# Patient Record
Sex: FEMALE | Race: WHITE | NOT HISPANIC OR LATINO | Employment: FULL TIME | URBAN - METROPOLITAN AREA
[De-identification: names, ages, dates, MRNs, and addresses within clinical notes are randomized per-mention and may not be internally consistent; named-entity substitution may affect disease eponyms.]

---

## 2022-09-12 ENCOUNTER — APPOINTMENT (OUTPATIENT)
Dept: ULTRASOUND IMAGING | Facility: HOSPITAL | Age: 37
End: 2022-09-12

## 2022-09-12 ENCOUNTER — HOSPITAL ENCOUNTER (EMERGENCY)
Facility: HOSPITAL | Age: 37
Discharge: HOME | End: 2022-09-12
Attending: EMERGENCY MEDICINE | Admitting: EMERGENCY MEDICINE

## 2022-09-12 VITALS
DIASTOLIC BLOOD PRESSURE: 55 MMHG | HEIGHT: 63 IN | WEIGHT: 186.29 LBS | OXYGEN SATURATION: 100 % | SYSTOLIC BLOOD PRESSURE: 112 MMHG | TEMPERATURE: 98.3 F | HEART RATE: 88 BPM | RESPIRATION RATE: 18 BRPM | BODY MASS INDEX: 33.01 KG/M2

## 2022-09-12 DIAGNOSIS — O20.9 VAGINAL BLEEDING IN PREGNANCY, FIRST TRIMESTER: ICD-10-CM

## 2022-09-12 DIAGNOSIS — O20.0 THREATENED ABORTION: Primary | ICD-10-CM

## 2022-09-12 LAB
ABO GROUP BLD: NORMAL
ABO GROUP BLD: NORMAL
ALBUMIN SERPL-MCNC: 4.4 G/DL (ref 3.5–5.2)
ALBUMIN/GLOB SERPL: 1.2 G/DL
ALP SERPL-CCNC: 75 U/L (ref 39–117)
ALT SERPL W P-5'-P-CCNC: 49 U/L (ref 1–33)
ANION GAP SERPL CALCULATED.3IONS-SCNC: 14.1 MMOL/L (ref 5–15)
APTT PPP: 35 SECONDS (ref 24.2–34.2)
AST SERPL-CCNC: 28 U/L (ref 1–32)
BASOPHILS # BLD AUTO: 0.02 10*3/MM3 (ref 0–0.2)
BASOPHILS NFR BLD AUTO: 0.4 % (ref 0–1.5)
BILIRUB SERPL-MCNC: 0.4 MG/DL (ref 0–1.2)
BLD GP AB SCN SERPL QL: NEGATIVE
BUN SERPL-MCNC: 5 MG/DL (ref 6–20)
BUN/CREAT SERPL: 9.3 (ref 7–25)
CALCIUM SPEC-SCNC: 9.2 MG/DL (ref 8.6–10.5)
CHLORIDE SERPL-SCNC: 99 MMOL/L (ref 98–107)
CO2 SERPL-SCNC: 21.9 MMOL/L (ref 22–29)
CREAT SERPL-MCNC: 0.54 MG/DL (ref 0.57–1)
DEPRECATED RDW RBC AUTO: 44.9 FL (ref 37–54)
EGFRCR SERPLBLD CKD-EPI 2021: 123.3 ML/MIN/1.73
EOSINOPHIL # BLD AUTO: 0.08 10*3/MM3 (ref 0–0.4)
EOSINOPHIL NFR BLD AUTO: 1.6 % (ref 0.3–6.2)
ERYTHROCYTE [DISTWIDTH] IN BLOOD BY AUTOMATED COUNT: 15.5 % (ref 12.3–15.4)
GLOBULIN UR ELPH-MCNC: 3.6 GM/DL
GLUCOSE SERPL-MCNC: 84 MG/DL (ref 65–99)
HCG INTACT+B SERPL-ACNC: NORMAL MIU/ML
HCT VFR BLD AUTO: 36.9 % (ref 34–46.6)
HGB BLD-MCNC: 12.2 G/DL (ref 12–15.9)
IMM GRANULOCYTES # BLD AUTO: 0.01 10*3/MM3 (ref 0–0.05)
IMM GRANULOCYTES NFR BLD AUTO: 0.2 % (ref 0–0.5)
INR PPP: 1.08 (ref 0.86–1.15)
LYMPHOCYTES # BLD AUTO: 1.96 10*3/MM3 (ref 0.7–3.1)
LYMPHOCYTES NFR BLD AUTO: 38.7 % (ref 19.6–45.3)
MCH RBC QN AUTO: 26.7 PG (ref 26.6–33)
MCHC RBC AUTO-ENTMCNC: 33.1 G/DL (ref 31.5–35.7)
MCV RBC AUTO: 80.7 FL (ref 79–97)
MONOCYTES # BLD AUTO: 0.25 10*3/MM3 (ref 0.1–0.9)
MONOCYTES NFR BLD AUTO: 4.9 % (ref 5–12)
NEUTROPHILS NFR BLD AUTO: 2.74 10*3/MM3 (ref 1.7–7)
NEUTROPHILS NFR BLD AUTO: 54.2 % (ref 42.7–76)
NRBC BLD AUTO-RTO: 0 /100 WBC (ref 0–0.2)
NUMBER OF DOSES: NORMAL
PLATELET # BLD AUTO: 321 10*3/MM3 (ref 140–450)
PMV BLD AUTO: 10.1 FL (ref 6–12)
POTASSIUM SERPL-SCNC: 3.3 MMOL/L (ref 3.5–5.2)
PROT SERPL-MCNC: 8 G/DL (ref 6–8.5)
PROTHROMBIN TIME: 14.1 SECONDS (ref 11.8–14.9)
RBC # BLD AUTO: 4.57 10*6/MM3 (ref 3.77–5.28)
RH BLD: NEGATIVE
RH BLD: NEGATIVE
SODIUM SERPL-SCNC: 135 MMOL/L (ref 136–145)
WBC NRBC COR # BLD: 5.06 10*3/MM3 (ref 3.4–10.8)

## 2022-09-12 PROCEDURE — 84702 CHORIONIC GONADOTROPIN TEST: CPT | Performed by: PHYSICIAN ASSISTANT

## 2022-09-12 PROCEDURE — 86900 BLOOD TYPING SEROLOGIC ABO: CPT | Performed by: EMERGENCY MEDICINE

## 2022-09-12 PROCEDURE — 25010000002 RHO D IMMUNE GLOBULIN 1500 UNIT/2ML SOLUTION PREFILLED SYRINGE: Performed by: PHYSICIAN ASSISTANT

## 2022-09-12 PROCEDURE — 86850 RBC ANTIBODY SCREEN: CPT | Performed by: EMERGENCY MEDICINE

## 2022-09-12 PROCEDURE — 80053 COMPREHEN METABOLIC PANEL: CPT | Performed by: PHYSICIAN ASSISTANT

## 2022-09-12 PROCEDURE — 86901 BLOOD TYPING SEROLOGIC RH(D): CPT | Performed by: PHYSICIAN ASSISTANT

## 2022-09-12 PROCEDURE — 86900 BLOOD TYPING SEROLOGIC ABO: CPT | Performed by: PHYSICIAN ASSISTANT

## 2022-09-12 PROCEDURE — 85025 COMPLETE CBC W/AUTO DIFF WBC: CPT | Performed by: PHYSICIAN ASSISTANT

## 2022-09-12 PROCEDURE — 76801 OB US < 14 WKS SINGLE FETUS: CPT

## 2022-09-12 PROCEDURE — 86901 BLOOD TYPING SEROLOGIC RH(D): CPT | Performed by: EMERGENCY MEDICINE

## 2022-09-12 PROCEDURE — 96372 THER/PROPH/DIAG INJ SC/IM: CPT

## 2022-09-12 PROCEDURE — 36415 COLL VENOUS BLD VENIPUNCTURE: CPT | Performed by: PHYSICIAN ASSISTANT

## 2022-09-12 PROCEDURE — 85610 PROTHROMBIN TIME: CPT | Performed by: PHYSICIAN ASSISTANT

## 2022-09-12 PROCEDURE — 36415 COLL VENOUS BLD VENIPUNCTURE: CPT

## 2022-09-12 PROCEDURE — 85730 THROMBOPLASTIN TIME PARTIAL: CPT | Performed by: PHYSICIAN ASSISTANT

## 2022-09-12 PROCEDURE — 99282 EMERGENCY DEPT VISIT SF MDM: CPT

## 2022-09-12 RX ADMIN — HUMAN RHO(D) IMMUNE GLOBULIN 300 MCG: 1500 SOLUTION INTRAMUSCULAR; INTRAVENOUS at 19:09

## 2022-09-12 NOTE — DISCHARGE INSTRUCTIONS
Your ultrasound shows a single fetus measuring 10 weeks and 4 days with a heart rate of 165. Your blood work and hormones look good. Call your OB/GYN first thing tomorrow to discuss your symptoms and schedule a close follow-up.

## 2022-09-12 NOTE — ED PROVIDER NOTES
Provider in Triage Note    --- PROVIDER IN TRIAGE NOTE ---    Patient was evaluated in triage by Bi ramos PA-C.  In short, the pt presented with vaginal bleeding. LMP 2022. She is . No complications with prior pregnancies or deliveries. Bleeding started yesterday. Bleeding was heavy, bright red. Last evening had large clots. Bleeding has stopped since last evening but spotting on toilet paper. Denies dysuria, abdominal pain, vomiting or diarrhea. Has had mild nausea at baseline. Had episode of back pain 2 days. Last intercourse was 2 days ago. Denies vaginal discharge/itching/odor.  First OB appointment scheduled for 10/10/2022. Orders were written and the patient was placed in the waiting room, currently awaiting disposition.       Time: 3:35 PM EDT  Arrived by: private car  Chief Complaint: vaginal bleeding - pregnant   History provided by: patient  History is limited by: N/A     History of Present Illness:  Patient is a 36 y.o. year old female who presents to the emergency department with vaginal bleeding.           History provided by:  Patient   used: No    Vaginal Bleeding - Pregnant  Quality:  Bright red and clots  Severity:  Moderate  Onset quality:  Gradual  Progression:  Partially resolved  Chronicity:  New  Prior pregnancy: .    Pregnancy confirmed by ultrasound: no    Gestational age:  LMP 2022  Prenatal care:  No prenatal care  Context: not after intercourse    Relieved by:  Nothing  Worsened by:  Nothing  Ineffective treatments:  None tried  Associated symptoms: nausea (at baseline)    Associated symptoms: no abdominal pain, no dyspareunia, no dysuria, no fever and no vaginal discharge    Risk factors: no bleeding disorder, no hx of ectopic pregnancy and no prior miscarriage        Similar Symptoms Previously: n/a  Recently seen: n/a      Patient Care Team  Primary Care Provider: Provider, No Known    Past Medical History:     No Known Allergies  History  "reviewed. No pertinent past medical history.  History reviewed. No pertinent surgical history.  History reviewed. No pertinent family history.    Home Medications:  Prior to Admission medications    Not on File        Social History:   Social History     Tobacco Use   • Smoking status: Never Smoker   • Smokeless tobacco: Never Used   Vaping Use   • Vaping Use: Never used   Substance Use Topics   • Alcohol use: Never   • Drug use: Never     Recent travel: not applicable     Review of Systems:  Review of Systems   Constitutional: Negative for chills and fever.   HENT: Negative for congestion and sore throat.    Respiratory: Negative for cough and shortness of breath.    Cardiovascular: Negative for chest pain and palpitations.   Gastrointestinal: Positive for nausea (at baseline). Negative for abdominal pain, diarrhea and vomiting.   Genitourinary: Positive for vaginal bleeding. Negative for dyspareunia, dysuria, vaginal discharge and vaginal pain.   Neurological: Negative for headaches.   All other systems reviewed and are negative.       Physical Exam:  /55 (BP Location: Left arm, Patient Position: Sitting)   Pulse 88   Temp 98.3 °F (36.8 °C) (Oral)   Resp 18   Ht 160 cm (63\")   Wt 84.5 kg (186 lb 4.6 oz)   LMP 06/09/2022 Comment: unknown MONI has not been able to get an appointment yet w OBGYN  SpO2 100%   Breastfeeding No   BMI 33.00 kg/m²     Physical Exam  Vitals and nursing note reviewed.   Constitutional:       Appearance: Normal appearance. She is not ill-appearing or toxic-appearing.   HENT:      Head: Normocephalic.      Nose: Nose normal.      Mouth/Throat:      Mouth: Mucous membranes are moist.   Eyes:      Conjunctiva/sclera: Conjunctivae normal.      Pupils: Pupils are equal, round, and reactive to light.   Cardiovascular:      Rate and Rhythm: Normal rate and regular rhythm.   Pulmonary:      Effort: Pulmonary effort is normal.      Breath sounds: Normal breath sounds.   Abdominal:      " General: There is no distension.   Musculoskeletal:         General: Normal range of motion.      Cervical back: Normal range of motion and neck supple.   Skin:     General: Skin is warm and dry.      Capillary Refill: Capillary refill takes less than 2 seconds.   Neurological:      General: No focal deficit present.      Mental Status: She is alert and oriented to person, place, and time.   Psychiatric:         Mood and Affect: Mood normal.         Behavior: Behavior normal.                Medications in the Emergency Department:  Medications   Rho D Immune Globulin (RHOPHYLAC) injection 300 mcg (300 mcg Intramuscular Given 9/12/22 1909)        Labs  Lab Results (last 24 hours)     Procedure Component Value Units Date/Time    CBC & Differential [[id number]]  (Abnormal) Collected: 09/12/22 1603    Specimen: Blood Updated: 09/12/22 1615    Narrative:      The following orders were created for panel order CBC & Differential.  Procedure                               Abnormality         Status                     ---------                               -----------         ------                     CBC Auto Differential[[id number]]        Abnormal            Final result                 Please view results for these tests on the individual orders.    Comprehensive Metabolic Panel [[id number]]  (Abnormal) Collected: 09/12/22 1603    Specimen: Blood Updated: 09/12/22 1640     Glucose 84 mg/dL      BUN 5 mg/dL      Creatinine 0.54 mg/dL      Sodium 135 mmol/L      Potassium 3.3 mmol/L      Chloride 99 mmol/L      CO2 21.9 mmol/L      Calcium 9.2 mg/dL      Total Protein 8.0 g/dL      Albumin 4.40 g/dL      ALT (SGPT) 49 U/L      AST (SGOT) 28 U/L      Alkaline Phosphatase 75 U/L      Total Bilirubin 0.4 mg/dL      Globulin 3.6 gm/dL      A/G Ratio 1.2 g/dL      BUN/Creatinine Ratio 9.3     Anion Gap 14.1 mmol/L      eGFR 123.3 mL/min/1.73      Comment: National Kidney Foundation and American Society of Nephrology (ASN) Task  Force recommended calculation based on the Chronic Kidney Disease Epidemiology Collaboration (CKD-EPI) equation refit without adjustment for race.       Narrative:      GFR Normal >60  Chronic Kidney Disease <60  Kidney Failure <15      Protime-INR [[id number]]  (Normal) Collected: 09/12/22 1603    Specimen: Blood Updated: 09/12/22 1632     Protime 14.1 Seconds      INR 1.08    Narrative:      Suggested Therapeutic Ranges For Oral Anticoagulant Therapy:  Level of Therapy                      INR Target Range  Standard Dose                            2.0-3.0  High Dose                                2.5-3.5  Patients not receiving anticoagulant  Therapy Normal Range                     0.86-1.15    aPTT [[id number]]  (Abnormal) Collected: 09/12/22 1603    Specimen: Blood Updated: 09/12/22 1632     PTT 35.0 seconds     hCG, Quantitative, Pregnancy [[id number]] Collected: 09/12/22 1603    Specimen: Blood Updated: 09/12/22 1658     HCG Quantitative 155,187.00 mIU/mL     Narrative:      HCG Ranges by Gestational Age    Females - non-pregnant premenopausal   </= 1mIU/mL HCG  Females - postmenopausal               </= 7mIU/mL HCG    3 Weeks       5.4   -      72 mIU/mL  4 Weeks      10.2   -     708 mIU/mL  5 Weeks       217   -   8,245 mIU/mL  6 Weeks       152   -  32,177 mIU/mL  7 Weeks     4,059   - 153,767 mIU/mL  8 Weeks    31,366   - 149,094 mIU/mL  9 Weeks    59,109   - 135,901 mIU/mL  10 Weeks   44,186   - 170,409 mIU/mL  12 Weeks   27,107   - 201,615 mIU/mL  14 Weeks   24,302   -  93,646 mIU/mL  15 Weeks   12,540   -  69,747 mIU/mL  16 Weeks    8,904   -  55,332 mIU/mL  17 Weeks    8,240   -  51,793 mIU/mL  18 Weeks    9,649   -  55,271 mIU/mL    Results may be falsely decreased if patient taking Biotin.      CBC Auto Differential [[id number]]  (Abnormal) Collected: 09/12/22 1603    Specimen: Blood Updated: 09/12/22 1615     WBC 5.06 10*3/mm3      RBC 4.57 10*6/mm3      Hemoglobin 12.2 g/dL      Hematocrit 36.9 %       MCV 80.7 fL      MCH 26.7 pg      MCHC 33.1 g/dL      RDW 15.5 %      RDW-SD 44.9 fl      MPV 10.1 fL      Platelets 321 10*3/mm3      Neutrophil % 54.2 %      Lymphocyte % 38.7 %      Monocyte % 4.9 %      Eosinophil % 1.6 %      Basophil % 0.4 %      Immature Grans % 0.2 %      Neutrophils, Absolute 2.74 10*3/mm3      Lymphocytes, Absolute 1.96 10*3/mm3      Monocytes, Absolute 0.25 10*3/mm3      Eosinophils, Absolute 0.08 10*3/mm3      Basophils, Absolute 0.02 10*3/mm3      Immature Grans, Absolute 0.01 10*3/mm3      nRBC 0.0 /100 WBC            Imaging:  US Ob < 14 Weeks Single or First Gestation    Result Date: 2022  PROCEDURE: US OB < 14 WEEKS SINGLE OR FIRST GESTATION  COMPARISON: None  INDICATIONS: vaginal bleeding  TECHNIQUE: Transabdominal OB pelvic ultrasound.   FINDINGS:   There is a single living intrauterine gestation.  Estimated gestational age by ultrasound 10 weeks 4 days.  Estimated date of delivery 2023.  Mean sac diameter 5 cm. Crown rump length 3 cm.  Fetal heart rate 165 bpm.  The ovaries have a normal appearance.  No abnormal pelvic fluid collections are identified.  IMPRESSION: Single living intrauterine gestation.  Estimated gestational age 10 weeks 4 days.  Estimated date of delivery 2023.   MONTANA POST MD       Electronically Signed and Approved By: MONTANA POST MD on 2022 at 17:32               Procedures:  Procedures    Progress  ED Course as of 22 2225   Mon Sep 12, 2022   1744 HCG Quantitative: 155,187.00 [KM]      ED Course User Index  [KM] Bi cMkenna PA-C                            The patient was initially evaluated in the triage area where orders were placed. The patient was later dispositioned by Bi Mckenna PA-C.      Medical Decision Making:  MDM     37 yo F  presents to ED with chief complaint of vaginal bleeding. Started yesterday, had episode of BRB and 2-3 clots but now only scant with wiping. Denies discharge, abdominal or  back pain. First OB appt 10/10/2022 in [place].     Abdomen benign, BS active, vitals reassuring.     CBC with WBC 5.06, no neutrophilia, H&H WNL  ,187  ABO/Rh O negative  PTT mildly elevated at 35  PT/INR wnl  CMP with mild hypokalemia, ALT 49 no prior on file to compare to. Normal total bili and no anion gap    OB US shows single IUP, fetal movement noted, . No evidence ectopic. No free fluid.     Discussed all findings with patient. Will administer rho-dedra in the ED. We discussed the diagnosis of threatened . Pt is to call her OB/GYN office in the morning and have close follow-up. Strict return precautions discussed including worsening abdominal pain, worsening bleeding olayinka > 1 pph.    I have spoken with the pt. I have explained the patient´s condition, diagnoses and treatment plan based on the information available to me at this time. I have answered the patient questions and addressed any concerns. The patient has a good  understanding of the patient´s diagnosis, condition, and treatment plan as can be expected at this point. The vital signs have been stable. The patient´s condition is stable and appropriate for discharge from the emergency department.      The patient will pursue further outpatient evaluation with the primary care physician or other designated or consulting physician as outlined in the discharge instructions. They are agreeable to this plan of care and follow-up instructions have been explained in detail. The patient  has received these instructions in written format and have expressed an understanding of the discharge instructions. The patient is aware that any significant change in condition or worsening of symptoms should prompt an immediate return to this or the closest emergency department or call to 911.     US Ob < 14 Weeks Single or First Gestation   Final Result         Labs Reviewed   COMPREHENSIVE METABOLIC PANEL - Abnormal; Notable for the following  components:       Result Value    BUN 5 (*)     Creatinine 0.54 (*)     Sodium 135 (*)     Potassium 3.3 (*)     CO2 21.9 (*)     ALT (SGPT) 49 (*)     All other components within normal limits    Narrative:     GFR Normal >60  Chronic Kidney Disease <60  Kidney Failure <15     APTT - Abnormal; Notable for the following components:    PTT 35.0 (*)     All other components within normal limits   CBC WITH AUTO DIFFERENTIAL - Abnormal; Notable for the following components:    RDW 15.5 (*)     Monocyte % 4.9 (*)     All other components within normal limits   PROTIME-INR - Normal    Narrative:     Suggested Therapeutic Ranges For Oral Anticoagulant Therapy:  Level of Therapy                      INR Target Range  Standard Dose                            2.0-3.0  High Dose                                2.5-3.5  Patients not receiving anticoagulant  Therapy Normal Range                     0.86-1.15   HCG, QUANTITATIVE, PREGNANCY    Narrative:     HCG Ranges by Gestational Age    Females - non-pregnant premenopausal   </= 1mIU/mL HCG  Females - postmenopausal               </= 7mIU/mL HCG    3 Weeks       5.4   -      72 mIU/mL  4 Weeks      10.2   -     708 mIU/mL  5 Weeks       217   -   8,245 mIU/mL  6 Weeks       152   -  32,177 mIU/mL  7 Weeks     4,059   - 153,767 mIU/mL  8 Weeks    31,366   - 149,094 mIU/mL  9 Weeks    59,109   - 135,901 mIU/mL  10 Weeks   44,186   - 170,409 mIU/mL  12 Weeks   27,107   - 201,615 mIU/mL  14 Weeks   24,302   -  93,646 mIU/mL  15 Weeks   12,540   -  69,747 mIU/mL  16 Weeks    8,904   -  55,332 mIU/mL  17 Weeks    8,240   -  51,793 mIU/mL  18 Weeks    9,649   -  55,271 mIU/mL    Results may be falsely decreased if patient taking Biotin.     ABO/RH    RHIG EVALUATION   DOSES OF RH IMMUNE GLOBULIN   CBC AND DIFFERENTIAL    Narrative:     The following orders were created for panel order CBC & Differential.  Procedure                               Abnormality         Status                      ---------                               -----------         ------                     CBC Auto Differential[[id number]]        Abnormal            Final result                 Please view results for these tests on the individual orders.    s  Final diagnoses:   Threatened    Vaginal bleeding in pregnancy, first trimester        Disposition:  ED Disposition     ED Disposition   Discharge    Condition   Stable    Comment   --             This medical record created using voice recognition software.           Bi Mckenna PA-C  [id number]

## 2022-10-09 NOTE — PROGRESS NOTES
OBSTETRIC HISTORY AND PHYSICAL     Subjective:  Latricia Alvarez is a 36 y.o.  at Unknown  here for her new OB visit. Patient pregnancy is dated by an early US at 10w4d. Patient's pregnancy is complicated by AMA, Multigravida, family H/O hemophilia.   She is taking her prenatal vitamins.Reports no loss of fluid or vaginal bleeding.No hx of genetic, bleeding, endocrine, chromosome disorder in both patient and partner. No history of multiple gestations, congenital anomalies or mental retardation.    FOB involvement: yes  Pediatrician: yes  Breast/Bottle: breast  Epidural: yes  Discussed adequate water intake, food guidelines/weight gain, limit caffiene to less than 200mg daily.   Discussed food, activities to avoid. Discussed seatbelt safety.   Reviewed safe meds in pregnancy handout.  Taking PNV: yes  Smoking cessation needed: no     Reviewed and updated:  OBHx, GYNHx (STDs), PMHx, Medications, Allergies, PSHx, Social Hx, Preventative Hx (PAP), Hx of abuse/safe environment, Vaccine Hx including hx of chickenpox or vaccine, Genetic Hx (pt, FOB, both families).        OB History    Para Term  AB Living   6 4 4   1 4   SAB IAB Ectopic Molar Multiple Live Births     1       4      # Outcome Date GA Lbr Aaron/2nd Weight Sex Delivery Anes PTL Lv   6 Current            5 Term 19 39w1d / 00:13 3920 g (8 lb 10.3 oz) F Vag-Spont EPI N BEA   4 Term  39w0d   F Vag-Spont   BEA   3 IAB            2 Term  40w0d   F Vag-Spont   BAE   1 Term  39w0d   F Vag-Spont   BEA     History reviewed. No pertinent past medical history.  History reviewed. No pertinent surgical history.  Family History   Problem Relation Age of Onset   • Breast cancer Paternal Grandmother    • Bone cancer Maternal Grandfather      No Known Allergies  Social History     Socioeconomic History   • Marital status:    Tobacco Use   • Smoking status: Never   • Smokeless tobacco: Never   Vaping Use   • Vaping Use: Never used    Substance and Sexual Activity   • Alcohol use: Never   • Drug use: Never   • Sexual activity: Yes     Partners: Male     Birth control/protection: None           ROS:  General ROS: negative for - chills or fatigue  Respiratory ROS: negative for - cough or hemoptysis  Cardiovascular ROS: negative for - chest pain or dyspnea on exertion  Gastrointestinal ROS: negative for - abdominal pain or appetite loss  Musculoskeletal ROS: negative for - gait disturbance or joint pain  Neurological ROS: negative for - behavioral changes or bowel and bladder control changes  Dermatological ROS: negative for rashes or lesions     Objective:  Physical Exam:   Vitals:    10/10/22 1055   BP: 105/71       General appearance - alert, well appearing, and in no distress  Mental status - alert, oriented to person, place, and time  Neck- Supple.  No nodularity or enlargement.  Heart- Regular rate and rhythm without murmur, gallop or rub.  Lungs- Clear to auscultation bilaterally, negative W/R/R  Breasts- Deferred to annual  Abdomen- Soft, Gravid uterus, non-tender  Extremeties: Normal ROM, Negative swelling or cyanosis  Neurological: Gait normal, Negative tingling or loss of sensation     Pelvic exam:   External genitalia- Normal, no lesions  Urethra- Normal, no masses, non tender  Vagina- Normal, no discharge  Bladder- Normal, no masses, non tender  Cervix- Normal, no lesions, no discharge, no CMT, pap smear obtained   Uterus- Normal shape and consistency, non tender.  FHR: 160 per bedside US   Adnexa- Normal, no mass, non tender    Counseling:   • Nutrition discussed, calories, activity/exercise in pregnancy  • Reviewed what to expect prenatal visits, office providers  • Appropriate trimester precautions provided, N/V, vag bleeding, cramping  • Questions answered  • VACCINE importance in pregnancy discussed.  Maternal and fetal risk of not being vaccinated reviewed NLT increased risk maternal/fetal severity of illness/death, PTD, CS,  hemorrhage, HTN, possible IUFD.  Significant maternal and fetal/infant benefit w vaccination.  FDA approval and ACOG/SMFM/CDC strong recommendation in pregnancy.  Questions answered.   • Discussed healthy weight gain.  Also discussed increased water intake, 200mg of caffeine daily, exercise and healthy eating habits.   • Encouraged patient to consider breastfeeding. Discussed that it is recommended by the CDC and WHO that women exclusively breastfeed for the first 6 months and continue to breastfeed up to one year. Discussed the health benefits of breastfeeding, including increased immune systems in infants, reduced risk of food allergies, and reduced risk of chronic disease as an adult. Maternal benefits include more PP weight loss, reduced risk of PP depression, breast/ovarian cancer risk reduced.     ASSESSMENT  Diagnoses and all orders for this visit:    1. Supervision of other normal pregnancy, antepartum (Primary)  -     OB Panel With HIV  -     POC Pregnancy, Urine  -     POC Urinalysis Dipstick  -     Urine Culture - Urine, Urine, Clean Catch  -     Hemoglobinopathy Fractionation Cascade  -     IGP,CtNgTv,rfx Aptima HPV ASCU  -     US Ob Detail Fetal Anatomy Single or First Gestation; Future    2. Rh negative, antepartum    3. Antepartum multigravida of advanced maternal age    4. 14 weeks gestation of pregnancy      PLAN:   -81mg ASA daily   -Patient to confirm type of hemophilia that father has   -Anatomy US in 4 weeks  -Prenatal labs drawn   -Declined NIPS, CF, MSAFP         Return in about 4 weeks (around 11/7/2022) for Routine Ob visit with Anatomy scan.    We have gone over the expected prenatal care to include the timing and content of visits including the anatomy ultrasound.  We discussed the content of the anatomy ultrasound and its limitations (the fact that ultrasound in general may not see up to 40% of abnormalities).  I informed her how to contact the office and/or on call person in the event of  any problems and encouraged her to do so when she feels it is necessary.  We then spent time answering her questions which she indicated were answered to her satisfaction.    Jadyn Miranda,   10/10/2022 12:20 EDT

## 2022-10-10 ENCOUNTER — INITIAL PRENATAL (OUTPATIENT)
Dept: OBSTETRICS AND GYNECOLOGY | Facility: CLINIC | Age: 37
End: 2022-10-10

## 2022-10-10 VITALS — BODY MASS INDEX: 32.24 KG/M2 | DIASTOLIC BLOOD PRESSURE: 71 MMHG | SYSTOLIC BLOOD PRESSURE: 105 MMHG | WEIGHT: 182 LBS

## 2022-10-10 DIAGNOSIS — O26.899 RH NEGATIVE, ANTEPARTUM: ICD-10-CM

## 2022-10-10 DIAGNOSIS — Z3A.14 14 WEEKS GESTATION OF PREGNANCY: ICD-10-CM

## 2022-10-10 DIAGNOSIS — Z67.91 RH NEGATIVE, ANTEPARTUM: ICD-10-CM

## 2022-10-10 DIAGNOSIS — O09.529 ANTEPARTUM MULTIGRAVIDA OF ADVANCED MATERNAL AGE: ICD-10-CM

## 2022-10-10 DIAGNOSIS — Z34.80 SUPERVISION OF OTHER NORMAL PREGNANCY, ANTEPARTUM: Primary | ICD-10-CM

## 2022-10-10 PROBLEM — Z83.2: Status: ACTIVE | Noted: 2022-10-10

## 2022-10-10 LAB
ABO GROUP BLD: NORMAL
B-HCG UR QL: POSITIVE
BASOPHILS # BLD AUTO: 0.02 10*3/MM3 (ref 0–0.2)
BASOPHILS NFR BLD AUTO: 0.4 % (ref 0–1.5)
BLD GP AB SCN SERPL QL: POSITIVE
DEPRECATED RDW RBC AUTO: 44.8 FL (ref 37–54)
EOSINOPHIL # BLD AUTO: 0.17 10*3/MM3 (ref 0–0.4)
EOSINOPHIL NFR BLD AUTO: 3.7 % (ref 0.3–6.2)
ERYTHROCYTE [DISTWIDTH] IN BLOOD BY AUTOMATED COUNT: 15.3 % (ref 12.3–15.4)
EXPIRATION DATE: ABNORMAL
GLUCOSE UR STRIP-MCNC: NEGATIVE MG/DL
HCT VFR BLD AUTO: 31.8 % (ref 34–46.6)
HGB BLD-MCNC: 10.9 G/DL (ref 12–15.9)
HIV1+2 AB SER QL: NORMAL
IMM GRANULOCYTES # BLD AUTO: 0.01 10*3/MM3 (ref 0–0.05)
IMM GRANULOCYTES NFR BLD AUTO: 0.2 % (ref 0–0.5)
INTERNAL NEGATIVE CONTROL: ABNORMAL
INTERNAL POSITIVE CONTROL: ABNORMAL
LYMPHOCYTES # BLD AUTO: 1.79 10*3/MM3 (ref 0.7–3.1)
LYMPHOCYTES NFR BLD AUTO: 39.2 % (ref 19.6–45.3)
Lab: ABNORMAL
MCH RBC QN AUTO: 27.3 PG (ref 26.6–33)
MCHC RBC AUTO-ENTMCNC: 34.3 G/DL (ref 31.5–35.7)
MCV RBC AUTO: 79.5 FL (ref 79–97)
MONOCYTES # BLD AUTO: 0.31 10*3/MM3 (ref 0.1–0.9)
MONOCYTES NFR BLD AUTO: 6.8 % (ref 5–12)
NEUTROPHILS NFR BLD AUTO: 2.27 10*3/MM3 (ref 1.7–7)
NEUTROPHILS NFR BLD AUTO: 49.7 % (ref 42.7–76)
NRBC BLD AUTO-RTO: 0 /100 WBC (ref 0–0.2)
PLATELET # BLD AUTO: 258 10*3/MM3 (ref 140–450)
PMV BLD AUTO: 10 FL (ref 6–12)
PROT UR STRIP-MCNC: ABNORMAL MG/DL
RBC # BLD AUTO: 4 10*6/MM3 (ref 3.77–5.28)
RESIDUAL RHIG DETECTED: NORMAL
RH BLD: NEGATIVE
WBC NRBC COR # BLD: 4.57 10*3/MM3 (ref 3.4–10.8)

## 2022-10-10 PROCEDURE — 87591 N.GONORRHOEAE DNA AMP PROB: CPT | Performed by: OBSTETRICS & GYNECOLOGY

## 2022-10-10 PROCEDURE — 86803 HEPATITIS C AB TEST: CPT | Performed by: OBSTETRICS & GYNECOLOGY

## 2022-10-10 PROCEDURE — 87491 CHLMYD TRACH DNA AMP PROBE: CPT | Performed by: OBSTETRICS & GYNECOLOGY

## 2022-10-10 PROCEDURE — 87086 URINE CULTURE/COLONY COUNT: CPT | Performed by: OBSTETRICS & GYNECOLOGY

## 2022-10-10 PROCEDURE — G0123 SCREEN CERV/VAG THIN LAYER: HCPCS | Performed by: OBSTETRICS & GYNECOLOGY

## 2022-10-10 PROCEDURE — 86870 RBC ANTIBODY IDENTIFICATION: CPT | Performed by: OBSTETRICS & GYNECOLOGY

## 2022-10-10 PROCEDURE — 81025 URINE PREGNANCY TEST: CPT | Performed by: OBSTETRICS & GYNECOLOGY

## 2022-10-10 PROCEDURE — 83020 HEMOGLOBIN ELECTROPHORESIS: CPT | Performed by: OBSTETRICS & GYNECOLOGY

## 2022-10-10 PROCEDURE — G0432 EIA HIV-1/HIV-2 SCREEN: HCPCS | Performed by: OBSTETRICS & GYNECOLOGY

## 2022-10-10 PROCEDURE — 87661 TRICHOMONAS VAGINALIS AMPLIF: CPT | Performed by: OBSTETRICS & GYNECOLOGY

## 2022-10-10 PROCEDURE — 0501F PRENATAL FLOW SHEET: CPT | Performed by: OBSTETRICS & GYNECOLOGY

## 2022-10-11 LAB
BACTERIA SPEC AEROBE CULT: NO GROWTH
HBV SURFACE AG SERPL QL IA: NORMAL
HCV AB SER DONR QL: NORMAL
RPR SER QL: NORMAL

## 2022-10-12 LAB
HGB A MFR BLD ELPH: 97.5 % (ref 96.4–98.8)
HGB A2 MFR BLD ELPH: 2.5 % (ref 1.8–3.2)
HGB F MFR BLD ELPH: 0 % (ref 0–2)
HGB FRACT BLD-IMP: NORMAL
HGB S MFR BLD ELPH: 0 %
RUBV IGG SERPL IA-ACNC: 1.97 INDEX

## 2022-10-16 LAB
C TRACH RRNA CVX QL NAA+PROBE: NEGATIVE
CONV .: NORMAL
CYTOLOGIST CVX/VAG CYTO: NORMAL
CYTOLOGY CVX/VAG DOC CYTO: NORMAL
CYTOLOGY CVX/VAG DOC THIN PREP: NORMAL
DX ICD CODE: NORMAL
HIV 1 & 2 AB SER-IMP: NORMAL
N GONORRHOEA RRNA CVX QL NAA+PROBE: NEGATIVE
OTHER STN SPEC: NORMAL
STAT OF ADQ CVX/VAG CYTO-IMP: NORMAL
T VAGINALIS RRNA SPEC QL NAA+PROBE: NEGATIVE

## 2022-11-06 PROBLEM — Z34.80 SUPERVISION OF OTHER NORMAL PREGNANCY, ANTEPARTUM: Status: ACTIVE | Noted: 2022-11-06

## 2022-11-06 NOTE — ASSESSMENT & PLAN NOTE
MONI finalize:Estimated Date of Delivery: 4/6/23      Genetic testing (NIPS-Quad)/CF/AFP:  Declined     COVID: recommended   Flu: recommended   Tdap:recommended     Rhogam: O Negative, rhogam at 28 weeks     Sterilization:?    Anatomy US:Growth at 77%, all anatomy visualized and WNL, Cephalic, 3VC, posterior placenta   FU US:

## 2022-11-06 NOTE — PROGRESS NOTES
Routine Prenatal Visit     Subjective  Latricia Alvarez is a 36 y.o.  at 18w4d here for her routine OB visit.   She is taking her prenatal vitamins.Reports no loss of fluid or vaginal bleeding. Patient doing well without any complaints. Pregnancy is complicated by:     Patient Active Problem List   Diagnosis   • FHx: hemophilia   • AMA (advanced maternal age) multigravida 35+   • Rh negative, antepartum   • Supervision of other normal pregnancy, antepartum         OB History    Para Term  AB Living   6 4 4   1 4   SAB IAB Ectopic Molar Multiple Live Births     1       4      # Outcome Date GA Lbr Aaron/2nd Weight Sex Delivery Anes PTL Lv   6 Current            5 Term 19 39w1d / 00:13 3920 g (8 lb 10.3 oz) F Vag-Spont EPI N BEA   4 Term  39w0d   F Vag-Spont   BEA   3 IAB            2 Term  40w0d   F Vag-Spont   BEA   1 Term  39w0d   F Vag-Spont   BEA           ROS:   General ROS: negative for - chills or fatigue  Respiratory ROS: negative for - cough or hemoptysis  Cardiovascular ROS: negative for - chest pain or dyspnea on exertion  Genito-Urinary ROS: negative for  change in urinary stream, vaginal discharge   Musculoskeletal ROS: negative for - gait disturbance or joint pain  Dermatological ROS: negative for acne,  dry skin or itching    Objective  Physical Exam:   Vitals:    22 1105   BP: 120/73       FHT: 110-160 BPM    General appearance - alert, well appearing, and in no distress  Mental status - alert, oriented to person, place, and time  Abdomen- Soft, Gravid uterus, non-tender to palpation  Musculoskeletal: negative for - gait disturbance or joint pain  Extremeties: negative swelling or cyanosis   Dermatological: negative rashes or skin lesions     Assessment/Plan:   Diagnoses and all orders for this visit:    1. Supervision of other normal pregnancy, antepartum (Primary)  Assessment & Plan:  MONI finalize:Estimated Date of Delivery: 23      Genetic testing  (NIPS-Quad)/CF/AFP:  Declined     COVID: recommended   Flu: recommended   Tdap:recommended     Rhogam: O Negative, rhogam at 28 weeks     Sterilization:?    Anatomy US:Growth at 77%, all anatomy visualized and WNL, Cephalic, 3VC, posterior placenta   FU US:    Orders:  -     POC Urinalysis Dipstick    2. FHx: hemophilia    3. Antepartum multigravida of advanced maternal age  Assessment & Plan:  81mg ASA daily       4. Rh negative, antepartum        Counseling:   Second trimester precautions  Round Ligament Pain:  The uterus has several ligaments which provide support and keep the uterus in place. As the  uterus grows these ligaments are pulled and stretched which often causes sharp stabbing like pain in the inguinal area.   You may find a pregnancy support band helpful. Changing positions may also help. Yoga is a great way to cope with round ligament and low back pain in pregnancy.    Massage may also help with low back pain   Things to Consider at this Point in your Pregnancy:  Some women experience swelling in their feet during pregnancy. Compression stockings may help  Drink plenty of water and stay active   Make sure you are eating frequent small meals, nuts are a wonderful snack to keep with you            Return in about 4 weeks (around 12/5/2022) for Routine OB visit.      We have gone over prenatal care to include the timing and content of visits. I informed her how to contact the office and/or on call person in the event of any problems and encouraged her to do so when she feels it is necessary.  We then spent time answering her questions which she indicated were answered to her satisfaction.    Jadyn Miranda DO  11/7/2022 11:17 EST

## 2022-11-07 ENCOUNTER — ROUTINE PRENATAL (OUTPATIENT)
Dept: OBSTETRICS AND GYNECOLOGY | Facility: CLINIC | Age: 37
End: 2022-11-07

## 2022-11-07 VITALS — WEIGHT: 181.4 LBS | SYSTOLIC BLOOD PRESSURE: 120 MMHG | DIASTOLIC BLOOD PRESSURE: 73 MMHG | BODY MASS INDEX: 32.13 KG/M2

## 2022-11-07 DIAGNOSIS — O26.899 RH NEGATIVE, ANTEPARTUM: ICD-10-CM

## 2022-11-07 DIAGNOSIS — Z34.80 SUPERVISION OF OTHER NORMAL PREGNANCY, ANTEPARTUM: Primary | ICD-10-CM

## 2022-11-07 DIAGNOSIS — Z67.91 RH NEGATIVE, ANTEPARTUM: ICD-10-CM

## 2022-11-07 DIAGNOSIS — O09.529 ANTEPARTUM MULTIGRAVIDA OF ADVANCED MATERNAL AGE: ICD-10-CM

## 2022-11-07 DIAGNOSIS — Z83.2: ICD-10-CM

## 2022-11-07 LAB
GLUCOSE UR STRIP-MCNC: NEGATIVE MG/DL
PROT UR STRIP-MCNC: NEGATIVE MG/DL

## 2022-11-07 PROCEDURE — 0502F SUBSEQUENT PRENATAL CARE: CPT | Performed by: OBSTETRICS & GYNECOLOGY

## 2022-12-05 ENCOUNTER — ROUTINE PRENATAL (OUTPATIENT)
Dept: OBSTETRICS AND GYNECOLOGY | Facility: CLINIC | Age: 37
End: 2022-12-05

## 2022-12-05 VITALS — BODY MASS INDEX: 33.73 KG/M2 | DIASTOLIC BLOOD PRESSURE: 72 MMHG | SYSTOLIC BLOOD PRESSURE: 116 MMHG | WEIGHT: 190.4 LBS

## 2022-12-05 DIAGNOSIS — O26.899 RH NEGATIVE, ANTEPARTUM: ICD-10-CM

## 2022-12-05 DIAGNOSIS — O09.529 ANTEPARTUM MULTIGRAVIDA OF ADVANCED MATERNAL AGE: ICD-10-CM

## 2022-12-05 DIAGNOSIS — Z67.91 RH NEGATIVE, ANTEPARTUM: ICD-10-CM

## 2022-12-05 DIAGNOSIS — Z34.80 SUPERVISION OF OTHER NORMAL PREGNANCY, ANTEPARTUM: Primary | ICD-10-CM

## 2022-12-05 LAB
GLUCOSE UR STRIP-MCNC: NEGATIVE MG/DL
PROT UR STRIP-MCNC: NEGATIVE MG/DL

## 2022-12-05 PROCEDURE — 0502F SUBSEQUENT PRENATAL CARE: CPT | Performed by: NURSE PRACTITIONER

## 2022-12-05 NOTE — PROGRESS NOTES
OB FOLLOW UP      Chief Complaint   Patient presents with   • Routine Prenatal Visit     Subjective:   • No complaints    Objective:  /72   Wt 86.4 kg (190 lb 6.4 oz)   LMP 06/09/2022 Comment: unknown MONI has not been able to get an appointment yet w OBGYN  BMI 33.73 kg/m²  3.81 kg (8 lb 6.4 oz)  Uterine Size: size equals dates  FHT: 110-160 BPM    See OB flow for edema, cvx exam if performed, and Upro/Uglu    Assessment and Plan:  22w4d  Reassuring pregnancy progress.  Questions answered.  Diagnoses and all orders for this visit:    1. Supervision of other normal pregnancy, antepartum (Primary)  Overview:  MONI finalize:Estimated Date of Delivery: 4/6/23      Genetic testing (NIPS-Quad)/CF/AFP:  Declined     COVID: recommended   Flu: recommended   Tdap:recommended     Rhogam: O Negative, rhogam at 28 weeks     Sterilization:?    Anatomy US:Growth at 77%, all anatomy visualized and WNL, Cephalic, 3VC, posterior placenta   FU US:        Assessment & Plan:  Doing will, no complaints  1hGTT next visit      Orders:  -     POC Urinalysis Dipstick    2. Antepartum multigravida of advanced maternal age  Overview:  81mg ASA daily     Assessment & Plan:  Is not taking, states will purchase OTC and begin      3. Rh negative, antepartum  Overview:  Received Rhogam on 9/12     Assessment & Plan:  Will need at 28 weeks      Counseling:    • Second trimester precautions  • Continue PNV.  Importance of healthy eating and staying active.    Return in about 4 weeks (around 1/2/2023) for [facility], OB follow up, 1hGTT.            Kevon Guillen, APRN  12/05/2022    [facility] [address]  [facility]  [address] [place] [address]  Dept: [phone]  Loc: [phone]

## 2023-01-08 NOTE — PROGRESS NOTES
Routine Prenatal Visit     Subjective  Latricia Alvarez is a 37 y.o.  at 27w4d here for her routine OB visit.   She is taking her prenatal vitamins.Reports no loss of fluid or vaginal bleeding. Patient doing well without any complaints. Pregnancy is complicated by:     Patient Active Problem List   Diagnosis   • FHx: hemophilia   • AMA (advanced maternal age) multigravida 35+   • Rh negative, antepartum   • Supervision of other normal pregnancy, antepartum   • Gestational proteinuria in second trimester         OB History    Para Term  AB Living   6 4 4   1 4   SAB IAB Ectopic Molar Multiple Live Births     1       4      # Outcome Date GA Lbr Aaron/2nd Weight Sex Delivery Anes PTL Lv   6 Current            5 Term 19 39w1d / 00:13 3920 g (8 lb 10.3 oz) F Vag-Spont EPI N BEA   4 Term  39w0d   F Vag-Spont   BEA   3 IAB            2 Term  40w0d   F Vag-Spont   BEA   1 Term  39w0d   F Vag-Spont   BEA           ROS:   General ROS: negative for - chills or fatigue  Respiratory ROS: negative for - cough or hemoptysis  Cardiovascular ROS: negative for - chest pain or dyspnea on exertion  Genito-Urinary ROS: negative for  change in urinary stream, vaginal discharge   Musculoskeletal ROS: negative for - gait disturbance or joint pain  Dermatological ROS: negative for acne,  dry skin or itching    Objective  Physical Exam:   Vitals:    23 1458   BP: 128/79       Uterine Size: size equals dates  FHT: 110-160 BPM    General appearance - alert, well appearing, and in no distress  Mental status - alert, oriented to person, place, and time  Abdomen- Soft, Gravid uterus, non-tender to palpation  Musculoskeletal: negative for - gait disturbance or joint pain  Extremeties: negative swelling or cyanosis   Dermatological: negative rashes or skin lesions       Assessment/Plan:   Diagnoses and all orders for this visit:    1. Rh negative, antepartum (Primary)  Assessment & Plan:  Needs  another dose 28 weeks     Orders:  -     Ambulatory Referral to [facility] For Infusion Treatment  -      RhIg Evaluation; Future  -     Doses of rh immune globulin; Future    2. Supervision of other normal pregnancy, antepartum  Assessment & Plan:  MONI finalize:Estimated Date of Delivery: 23      Genetic testing (NIPS-Quad)/CF/AFP:  Declined     COVID: recommended   Flu: recommended   Tdap:recommended     Rhogam: O Negative, rhogam at 28 weeks     Sterilization:?    Anatomy US:Growth at 77%, all anatomy visualized and WNL, Cephalic, 3VC, posterior placenta   FU US:    Orders:  -     POC Urinalysis Dipstick  -     CBC (No Diff); Future  -     Gestational Diabetes Screen 1 Hour; Future  -     CBC & Differential; Future    3. Antepartum multigravida of advanced maternal age  Assessment & Plan:  81mg daily asa      4. FHx: hemophilia    5. Gestational proteinuria in third trimester  -     Protein / Creatinine Ratio, Urine - Urine, Clean Catch  -     Comprehensive Metabolic Panel; Future        Counseling:   Second trimester precautions  Round Ligament Pain:  The uterus has several ligaments which provide support and keep the uterus in place. As the  uterus grows these ligaments are pulled and stretched which often causes sharp stabbing like pain in the inguinal area.   You may find a pregnancy support band helpful. Changing positions may also help. Yoga is a great way to cope with round ligament and low back pain in pregnancy.    Massage may also help with low back pain   Things to Consider at this Point in your Pregnancy:  Some women experience swelling in their feet during pregnancy. Compression stockings may help  Drink plenty of water and stay active   Make sure you are eating frequent small meals, nuts are a wonderful snack to keep with you            Return in about 2 weeks (around 2023) for Routine OB visit.      We have gone over prenatal care to include the timing and content of visits. I informed  her how to contact the office and/or on call person in the event of any problems and encouraged her to do so when she feels it is necessary.  We then spent time answering her questions which she indicated were answered to her satisfaction.    Jadyn Miranda DO  1/9/2023 15:28 EST

## 2023-01-09 ENCOUNTER — ROUTINE PRENATAL (OUTPATIENT)
Dept: OBSTETRICS AND GYNECOLOGY | Facility: CLINIC | Age: 38
End: 2023-01-09
Payer: COMMERCIAL

## 2023-01-09 VITALS — WEIGHT: 186 LBS | BODY MASS INDEX: 32.95 KG/M2 | DIASTOLIC BLOOD PRESSURE: 79 MMHG | SYSTOLIC BLOOD PRESSURE: 128 MMHG

## 2023-01-09 DIAGNOSIS — O26.899 RH NEGATIVE, ANTEPARTUM: Primary | ICD-10-CM

## 2023-01-09 DIAGNOSIS — O09.529 ANTEPARTUM MULTIGRAVIDA OF ADVANCED MATERNAL AGE: ICD-10-CM

## 2023-01-09 DIAGNOSIS — Z67.91 RH NEGATIVE, ANTEPARTUM: Primary | ICD-10-CM

## 2023-01-09 DIAGNOSIS — O12.13 GESTATIONAL PROTEINURIA IN THIRD TRIMESTER: ICD-10-CM

## 2023-01-09 DIAGNOSIS — Z83.2: ICD-10-CM

## 2023-01-09 DIAGNOSIS — Z34.80 SUPERVISION OF OTHER NORMAL PREGNANCY, ANTEPARTUM: ICD-10-CM

## 2023-01-09 PROBLEM — O12.12 GESTATIONAL PROTEINURIA IN SECOND TRIMESTER: Status: ACTIVE | Noted: 2023-01-09

## 2023-01-09 LAB
CREAT UR-MCNC: 227.7 MG/DL
GLUCOSE 1H P GLC SERPL-MCNC: 105 MG/DL (ref 65–139)
GLUCOSE UR STRIP-MCNC: NEGATIVE MG/DL
PROT ?TM UR-MCNC: 68.2 MG/DL
PROT UR STRIP-MCNC: ABNORMAL MG/DL
PROT/CREAT UR: 0.3 MG/G{CREAT}

## 2023-01-09 PROCEDURE — 84156 ASSAY OF PROTEIN URINE: CPT | Performed by: OBSTETRICS & GYNECOLOGY

## 2023-01-09 PROCEDURE — 80053 COMPREHEN METABOLIC PANEL: CPT | Performed by: OBSTETRICS & GYNECOLOGY

## 2023-01-09 PROCEDURE — 0502F SUBSEQUENT PRENATAL CARE: CPT | Performed by: OBSTETRICS & GYNECOLOGY

## 2023-01-09 PROCEDURE — 82950 GLUCOSE TEST: CPT | Performed by: OBSTETRICS & GYNECOLOGY

## 2023-01-09 PROCEDURE — 82570 ASSAY OF URINE CREATININE: CPT | Performed by: OBSTETRICS & GYNECOLOGY

## 2023-01-09 PROCEDURE — 85025 COMPLETE CBC W/AUTO DIFF WBC: CPT | Performed by: OBSTETRICS & GYNECOLOGY

## 2023-01-10 DIAGNOSIS — D50.9 IRON DEFICIENCY ANEMIA DURING PREGNANCY: Primary | ICD-10-CM

## 2023-01-10 DIAGNOSIS — Z34.80 SUPERVISION OF OTHER NORMAL PREGNANCY, ANTEPARTUM: ICD-10-CM

## 2023-01-10 DIAGNOSIS — O99.019 IRON DEFICIENCY ANEMIA DURING PREGNANCY: Primary | ICD-10-CM

## 2023-01-10 LAB
ALBUMIN SERPL-MCNC: 3.3 G/DL (ref 3.5–5.2)
ALBUMIN/GLOB SERPL: 1.2 G/DL
ALP SERPL-CCNC: 70 U/L (ref 39–117)
ALT SERPL W P-5'-P-CCNC: 7 U/L (ref 1–33)
ANION GAP SERPL CALCULATED.3IONS-SCNC: 7.6 MMOL/L (ref 5–15)
AST SERPL-CCNC: 12 U/L (ref 1–32)
BASOPHILS # BLD AUTO: 0.03 10*3/MM3 (ref 0–0.2)
BASOPHILS NFR BLD AUTO: 0.3 % (ref 0–1.5)
BILIRUB SERPL-MCNC: <0.2 MG/DL (ref 0–1.2)
BUN SERPL-MCNC: 3 MG/DL (ref 6–20)
BUN/CREAT SERPL: 7.3 (ref 7–25)
CALCIUM SPEC-SCNC: 8.4 MG/DL (ref 8.6–10.5)
CHLORIDE SERPL-SCNC: 106 MMOL/L (ref 98–107)
CO2 SERPL-SCNC: 23.4 MMOL/L (ref 22–29)
CREAT SERPL-MCNC: 0.41 MG/DL (ref 0.57–1)
DEPRECATED RDW RBC AUTO: 42.8 FL (ref 37–54)
EGFRCR SERPLBLD CKD-EPI 2021: 131 ML/MIN/1.73
EOSINOPHIL # BLD AUTO: 0.3 10*3/MM3 (ref 0–0.4)
EOSINOPHIL NFR BLD AUTO: 3.4 % (ref 0.3–6.2)
ERYTHROCYTE [DISTWIDTH] IN BLOOD BY AUTOMATED COUNT: 14.4 % (ref 12.3–15.4)
GLOBULIN UR ELPH-MCNC: 2.8 GM/DL
GLUCOSE SERPL-MCNC: 106 MG/DL (ref 65–99)
HCT VFR BLD AUTO: 26.4 % (ref 34–46.6)
HGB BLD-MCNC: 8.5 G/DL (ref 12–15.9)
IMM GRANULOCYTES # BLD AUTO: 0.04 10*3/MM3 (ref 0–0.05)
IMM GRANULOCYTES NFR BLD AUTO: 0.4 % (ref 0–0.5)
LYMPHOCYTES # BLD AUTO: 2.2 10*3/MM3 (ref 0.7–3.1)
LYMPHOCYTES NFR BLD AUTO: 24.7 % (ref 19.6–45.3)
MCH RBC QN AUTO: 26.7 PG (ref 26.6–33)
MCHC RBC AUTO-ENTMCNC: 32.2 G/DL (ref 31.5–35.7)
MCV RBC AUTO: 83 FL (ref 79–97)
MONOCYTES # BLD AUTO: 0.39 10*3/MM3 (ref 0.1–0.9)
MONOCYTES NFR BLD AUTO: 4.4 % (ref 5–12)
NEUTROPHILS NFR BLD AUTO: 5.94 10*3/MM3 (ref 1.7–7)
NEUTROPHILS NFR BLD AUTO: 66.8 % (ref 42.7–76)
NRBC BLD AUTO-RTO: 0 /100 WBC (ref 0–0.2)
PLATELET # BLD AUTO: 266 10*3/MM3 (ref 140–450)
PMV BLD AUTO: 10 FL (ref 6–12)
POTASSIUM SERPL-SCNC: 3.6 MMOL/L (ref 3.5–5.2)
PROT SERPL-MCNC: 6.1 G/DL (ref 6–8.5)
RBC # BLD AUTO: 3.18 10*6/MM3 (ref 3.77–5.28)
SODIUM SERPL-SCNC: 137 MMOL/L (ref 136–145)
WBC NRBC COR # BLD: 8.9 10*3/MM3 (ref 3.4–10.8)

## 2023-01-10 RX ORDER — FERROUS SULFATE 325(65) MG
325 TABLET ORAL
Qty: 30 TABLET | Refills: 2 | Status: SHIPPED | OUTPATIENT
Start: 2023-01-10

## 2023-01-10 NOTE — TELEPHONE ENCOUNTER
----- Message from Jadyn Miranda DO sent at 1/10/2023  7:13 AM EST -----  Passed 1 hr GTT, hemoglobin low. Recommend iron panel and iron infusion.     Electronically signed by:    Jadyn Miranda DO  01/10/23  07:13 EST

## 2023-01-10 NOTE — TELEPHONE ENCOUNTER
Discussed results with pt. I let her know that it is recommended and iron panel be done and iron infusions. She is wanting to try to do a supplementation before infusions. Please advise.

## 2023-01-16 ENCOUNTER — LAB (OUTPATIENT)
Dept: LAB | Facility: HOSPITAL | Age: 38
End: 2023-01-16
Payer: COMMERCIAL

## 2023-01-16 ENCOUNTER — HOSPITAL ENCOUNTER (OUTPATIENT)
Dept: INFUSION THERAPY | Facility: HOSPITAL | Age: 38
Discharge: HOME | End: 2023-01-16
Payer: COMMERCIAL

## 2023-01-16 VITALS
RESPIRATION RATE: 18 BRPM | BODY MASS INDEX: 33.05 KG/M2 | OXYGEN SATURATION: 100 % | TEMPERATURE: 97.4 F | HEIGHT: 63 IN | DIASTOLIC BLOOD PRESSURE: 71 MMHG | SYSTOLIC BLOOD PRESSURE: 120 MMHG | WEIGHT: 186.51 LBS | HEART RATE: 112 BPM

## 2023-01-16 DIAGNOSIS — O99.019 IRON DEFICIENCY ANEMIA DURING PREGNANCY: Primary | ICD-10-CM

## 2023-01-16 DIAGNOSIS — Z67.91 RH NEGATIVE, ANTEPARTUM: ICD-10-CM

## 2023-01-16 DIAGNOSIS — O26.899 RH NEGATIVE, ANTEPARTUM: ICD-10-CM

## 2023-01-16 DIAGNOSIS — D50.9 IRON DEFICIENCY ANEMIA DURING PREGNANCY: Primary | ICD-10-CM

## 2023-01-16 LAB
ABO GROUP BLD: NORMAL
BLD GP AB SCN SERPL QL: NEGATIVE
NUMBER OF DOSES: NORMAL
RH BLD: NEGATIVE

## 2023-01-16 PROCEDURE — 36415 COLL VENOUS BLD VENIPUNCTURE: CPT

## 2023-01-16 PROCEDURE — 86850 RBC ANTIBODY SCREEN: CPT

## 2023-01-16 PROCEDURE — 96372 THER/PROPH/DIAG INJ SC/IM: CPT

## 2023-01-16 PROCEDURE — 86900 BLOOD TYPING SEROLOGIC ABO: CPT

## 2023-01-16 PROCEDURE — 86901 BLOOD TYPING SEROLOGIC RH(D): CPT

## 2023-01-16 PROCEDURE — 25010000002 RHO D IMMUNE GLOBULIN 1500 UNIT/2ML SOLUTION PREFILLED SYRINGE: Performed by: OBSTETRICS & GYNECOLOGY

## 2023-01-16 RX ADMIN — HUMAN RHO(D) IMMUNE GLOBULIN 1500 UNITS: 1500 SOLUTION INTRAMUSCULAR; INTRAVENOUS at 13:31

## 2023-01-23 ENCOUNTER — ROUTINE PRENATAL (OUTPATIENT)
Dept: OBSTETRICS AND GYNECOLOGY | Facility: CLINIC | Age: 38
End: 2023-01-23
Payer: COMMERCIAL

## 2023-01-23 VITALS — SYSTOLIC BLOOD PRESSURE: 105 MMHG | BODY MASS INDEX: 32.95 KG/M2 | DIASTOLIC BLOOD PRESSURE: 68 MMHG | WEIGHT: 186 LBS

## 2023-01-23 DIAGNOSIS — O12.12 GESTATIONAL PROTEINURIA IN SECOND TRIMESTER: ICD-10-CM

## 2023-01-23 DIAGNOSIS — O09.529 ANTEPARTUM MULTIGRAVIDA OF ADVANCED MATERNAL AGE: ICD-10-CM

## 2023-01-23 DIAGNOSIS — Z67.91 RH NEGATIVE, ANTEPARTUM: ICD-10-CM

## 2023-01-23 DIAGNOSIS — Z34.80 SUPERVISION OF OTHER NORMAL PREGNANCY, ANTEPARTUM: Primary | ICD-10-CM

## 2023-01-23 DIAGNOSIS — D50.9 IRON DEFICIENCY ANEMIA DURING PREGNANCY: ICD-10-CM

## 2023-01-23 DIAGNOSIS — O26.899 RH NEGATIVE, ANTEPARTUM: ICD-10-CM

## 2023-01-23 DIAGNOSIS — O99.019 IRON DEFICIENCY ANEMIA DURING PREGNANCY: ICD-10-CM

## 2023-01-23 LAB
GLUCOSE UR STRIP-MCNC: NEGATIVE MG/DL
PROT UR STRIP-MCNC: NEGATIVE MG/DL

## 2023-01-23 PROCEDURE — 85027 COMPLETE CBC AUTOMATED: CPT | Performed by: OBSTETRICS & GYNECOLOGY

## 2023-01-23 PROCEDURE — 36415 COLL VENOUS BLD VENIPUNCTURE: CPT | Performed by: NURSE PRACTITIONER

## 2023-01-23 PROCEDURE — 0502F SUBSEQUENT PRENATAL CARE: CPT | Performed by: NURSE PRACTITIONER

## 2023-01-23 NOTE — PROGRESS NOTES
OB FOLLOW UP      Chief Complaint   Patient presents with   • Routine Prenatal Visit     Subjective:   • No complaints    Objective:  /68   Wt 84.4 kg (186 lb)   LMP 06/09/2022 Comment: unknown MONI has not been able to get an appointment yet w OBGYN  BMI 32.95 kg/m²  1.814 kg (4 lb)  Uterine Size: size equals dates  FHT: 110-160 BPM    See OB flow for edema, cvx exam if performed, and Upro/Uglu    Assessment and Plan:  29w4d  Reassuring pregnancy progress.  Questions answered.  Diagnoses and all orders for this visit:    1. Supervision of other normal pregnancy, antepartum (Primary)  Overview:  MONI finalize:Estimated Date of Delivery: 4/6/23      Genetic testing (NIPS-Quad)/CF/AFP:  Declined     COVID: recommended   Flu: recommended   Tdap:recommended     Rhogam: O Negative, rhogam at 28 weeks, 1/16/23    Sterilization:?    Anatomy US:Growth at 77%, all anatomy visualized and WNL, Cephalic, 3VC, posterior placenta   FU US:        Orders:  -     POC Urinalysis Dipstick    2. Iron deficiency anemia during pregnancy  Assessment & Plan:  Is taking her iron supplement, will draw CBC today as previously ordered      3. Rh negative, antepartum  Overview:  Received Rhogam on 9/12   Repeat dose on 1/16/23    Assessment & Plan:  Received repeat dose on 1/16/23      4. Antepartum multigravida of advanced maternal age  Overview:  81mg ASA daily       5. Gestational proteinuria in second trimester  Overview:  2+ protein   CBC, CMP, ME/Cr ordered     Assessment & Plan:  Urine dip negative for protein today      Counseling:    • FKC  • HTN precautions, HA, vision change, RUQ/epigastric pain, edema  • Continue PNV.  Importance of healthy eating and staying active.    Return in about 2 weeks (around 2/6/2023) for [facility], OB follow up.            Kevon Guillen, APRN  01/23/2023    [facility] [address] [place]  [facility]  [address] [address]  Dept: [phone]  Loc:  [phone]

## 2023-01-24 LAB
DEPRECATED RDW RBC AUTO: 44.6 FL (ref 37–54)
ERYTHROCYTE [DISTWIDTH] IN BLOOD BY AUTOMATED COUNT: 15 % (ref 12.3–15.4)
HCT VFR BLD AUTO: 26.7 % (ref 34–46.6)
HGB BLD-MCNC: 8.6 G/DL (ref 12–15.9)
MCH RBC QN AUTO: 26.6 PG (ref 26.6–33)
MCHC RBC AUTO-ENTMCNC: 32.2 G/DL (ref 31.5–35.7)
MCV RBC AUTO: 82.7 FL (ref 79–97)
PLATELET # BLD AUTO: 324 10*3/MM3 (ref 140–450)
PMV BLD AUTO: 10.7 FL (ref 6–12)
RBC # BLD AUTO: 3.23 10*6/MM3 (ref 3.77–5.28)
WBC NRBC COR # BLD: 8.35 10*3/MM3 (ref 3.4–10.8)

## 2023-02-06 ENCOUNTER — ROUTINE PRENATAL (OUTPATIENT)
Dept: OBSTETRICS AND GYNECOLOGY | Facility: CLINIC | Age: 38
End: 2023-02-06
Payer: COMMERCIAL

## 2023-02-06 VITALS — BODY MASS INDEX: 32.59 KG/M2 | SYSTOLIC BLOOD PRESSURE: 120 MMHG | WEIGHT: 184 LBS | DIASTOLIC BLOOD PRESSURE: 78 MMHG

## 2023-02-06 DIAGNOSIS — O26.13 POOR WEIGHT GAIN OF PREGNANCY, THIRD TRIMESTER: ICD-10-CM

## 2023-02-06 DIAGNOSIS — Z67.91 RH NEGATIVE, ANTEPARTUM: ICD-10-CM

## 2023-02-06 DIAGNOSIS — O09.529 ANTEPARTUM MULTIGRAVIDA OF ADVANCED MATERNAL AGE: ICD-10-CM

## 2023-02-06 DIAGNOSIS — O26.899 RH NEGATIVE, ANTEPARTUM: ICD-10-CM

## 2023-02-06 DIAGNOSIS — O99.019 IRON DEFICIENCY ANEMIA DURING PREGNANCY: ICD-10-CM

## 2023-02-06 DIAGNOSIS — Z34.80 SUPERVISION OF OTHER NORMAL PREGNANCY, ANTEPARTUM: Primary | ICD-10-CM

## 2023-02-06 DIAGNOSIS — D50.9 IRON DEFICIENCY ANEMIA DURING PREGNANCY: ICD-10-CM

## 2023-02-06 PROBLEM — Z83.2: Status: RESOLVED | Noted: 2022-10-10 | Resolved: 2023-02-06

## 2023-02-06 PROBLEM — Z83.2 FAMILY HISTORY OF HEMOPHILIA: Status: ACTIVE | Noted: 2019-01-07

## 2023-02-06 PROBLEM — O12.12 GESTATIONAL PROTEINURIA IN SECOND TRIMESTER: Status: RESOLVED | Noted: 2023-01-09 | Resolved: 2023-02-06

## 2023-02-06 LAB
GLUCOSE UR STRIP-MCNC: NEGATIVE MG/DL
PROT UR STRIP-MCNC: NEGATIVE MG/DL

## 2023-02-06 PROCEDURE — 0502F SUBSEQUENT PRENATAL CARE: CPT | Performed by: OBSTETRICS & GYNECOLOGY

## 2023-02-06 NOTE — PROGRESS NOTES
OB FOLLOW UP    CC: Scheduled OB routine FU     Prenatal care complicated by:   Patient Active Problem List   Diagnosis   • AMA (advanced maternal age) multigravida 35+   • Rh negative, antepartum   • Supervision of other normal pregnancy, antepartum   • Iron deficiency anemia during pregnancy   • Family history of hemophilia   • Poor weight gain of pregnancy, third trimester       Subjective:   Patient has: No complaints, No leaking fluid, No vaginal bleeding, No contractions, Adequate FM    Objective:  Urine glucose/protein- see flow sheet      /78   Wt 83.5 kg (184 lb)   LMP 2022 Comment: unknown MONI has not been able to get an appointment yet w OBGYN  BMI 32.59 kg/m²   See OB flow for LE edema, and cvx exam if applicable  FHT: 141 BPM   Uterine Size: 31 cm      Assessment and Plan:  Diagnoses and all orders for this visit:    1. Supervision of other normal pregnancy, antepartum (Primary)  Overview:  MONI finalize:Estimated Date of Delivery: 23      Genetic testing (NIPS-Quad)/CF/AFP:  Declined     COVID: recommended   Flu: recommended   Tdap:recommended     Rhogam: O Negative, rhogam at 28 weeks, 23    Anatomy US:Growth at 77%, all anatomy visualized and WNL, Cephalic, 3VC, posterior placenta     Assessment & Plan:  Continue prenatal vitamins  Fetal kick counts   labor warnings    Orders:  -     POC Urinalysis Dipstick    2. Antepartum multigravida of advanced maternal age  Overview:  81mg ASA daily       3. Iron deficiency anemia during pregnancy  Assessment & Plan:  Continue ferrous sulfate      4. Rh negative, antepartum  Overview:  Received Rhogam on    Repeat dose on 23      5. Poor weight gain of pregnancy, third trimester  -     US Ob 14 + Weeks Single or First Gestation; Future        31w4d  Reassuring pregnancy progress    Counseling: OB precautions, leaking, VB, danae white vs PTL/Labor  FKC    Questions answered    Return in about 2 weeks (around 2023) for  [facility].      Sanjay Hayes MD  02/06/2023

## 2023-02-15 NOTE — PROGRESS NOTES
Venipuncture performed  by DESHAWN (employee) with good hemostasis. Patient tolerated well. Date 1/23/23 AF

## 2023-02-20 ENCOUNTER — ROUTINE PRENATAL (OUTPATIENT)
Dept: OBSTETRICS AND GYNECOLOGY | Facility: CLINIC | Age: 38
End: 2023-02-20
Payer: COMMERCIAL

## 2023-02-20 VITALS — WEIGHT: 181.6 LBS | SYSTOLIC BLOOD PRESSURE: 130 MMHG | DIASTOLIC BLOOD PRESSURE: 84 MMHG | BODY MASS INDEX: 32.17 KG/M2

## 2023-02-20 DIAGNOSIS — Z34.80 SUPERVISION OF OTHER NORMAL PREGNANCY, ANTEPARTUM: Primary | ICD-10-CM

## 2023-02-20 DIAGNOSIS — O09.529 ANTEPARTUM MULTIGRAVIDA OF ADVANCED MATERNAL AGE: ICD-10-CM

## 2023-02-20 DIAGNOSIS — O26.899 RH NEGATIVE, ANTEPARTUM: ICD-10-CM

## 2023-02-20 DIAGNOSIS — O99.019 IRON DEFICIENCY ANEMIA DURING PREGNANCY: ICD-10-CM

## 2023-02-20 DIAGNOSIS — Z83.2 FAMILY HISTORY OF HEMOPHILIA: ICD-10-CM

## 2023-02-20 DIAGNOSIS — D50.9 IRON DEFICIENCY ANEMIA DURING PREGNANCY: ICD-10-CM

## 2023-02-20 DIAGNOSIS — Z67.91 RH NEGATIVE, ANTEPARTUM: ICD-10-CM

## 2023-02-20 LAB
GLUCOSE UR STRIP-MCNC: NEGATIVE MG/DL
PROT UR STRIP-MCNC: NEGATIVE MG/DL

## 2023-02-20 PROCEDURE — 0502F SUBSEQUENT PRENATAL CARE: CPT | Performed by: OBSTETRICS & GYNECOLOGY

## 2023-02-20 NOTE — ASSESSMENT & PLAN NOTE
MONI finalize:Estimated Date of Delivery: 4/6/23      Genetic testing (NIPS-Quad)/CF/AFP:  Declined     COVID: recommended   Flu: recommended   Tdap:recommended     Rhogam: O Negative, rhogam at 28 weeks     Sterilization:?    Anatomy US:Growth at 77%, all anatomy visualized and WNL, Cephalic, 3VC, posterior placenta   FU US:ordered

## 2023-02-20 NOTE — PROGRESS NOTES
Routine Prenatal Visit     Subjective  Latricia Alvarez is a 37 y.o.  at 33w4d here for her routine OB visit.   She is taking her prenatal vitamins.Reports no loss of fluid or vaginal bleeding. Patient doing well without any complaints. Pregnancy complicated by:     Patient Active Problem List   Diagnosis   • AMA (advanced maternal age) multigravida 35+   • Rh negative, antepartum   • Supervision of other normal pregnancy, antepartum   • Iron deficiency anemia during pregnancy   • Family history of hemophilia   • Poor weight gain of pregnancy, third trimester         OB History    Para Term  AB Living   6 4 4   1 4   SAB IAB Ectopic Molar Multiple Live Births     1       4      # Outcome Date GA Lbr Aaron/2nd Weight Sex Delivery Anes PTL Lv   6 Current            5 Term 19 39w1d / 00:13 3920 g (8 lb 10.3 oz) F Vag-Spont EPI N BEA   4 Term  39w0d   F Vag-Spont   BEA   3 IAB            2 Term  40w0d   F Vag-Spont   BEA   1 Term  39w0d   F Vag-Spont   BEA           ROS:   General ROS: negative for - chills or fatigue  Respiratory ROS: negative for - cough or hemoptysis  Cardiovascular ROS: negative for - chest pain or dyspnea on exertion  Genito-Urinary ROS: negative for  change in urinary stream, vaginal discharge   Musculoskeletal ROS: negative for - gait disturbance or joint pain  Dermatological ROS: negative for acne,  dry skin or itching    Objective  Physical Exam:   Vitals:    23 1453   BP: 130/84       Uterine Size: size equals dates  FHT: 110-160 BPM    General appearance - alert, well appearing, and in no distress  Mental status - alert, oriented to person, place, and time  Abdomen- Soft, Gravid uterus, non-tender to palpation  Musculoskeletal: negative for - gait disturbance or joint pain  Extremeties: negative swelling or cyanosis   Dermatological: negative rashes or skin lesions       Assessment/Plan:   Diagnoses and all orders for this visit:    1. Supervision  of other normal pregnancy, antepartum (Primary)  Assessment & Plan:  MONI finalize:Estimated Date of Delivery: 4/6/23      Genetic testing (NIPS-Quad)/CF/AFP:  Declined     COVID: recommended   Flu: recommended   Tdap:recommended     Rhogam: O Negative, rhogam at 28 weeks     Sterilization:?    Anatomy US:Growth at 77%, all anatomy visualized and WNL, Cephalic, 3VC, posterior placenta   FU US:ordered      Orders:  -     POC Urinalysis Dipstick    2. Antepartum multigravida of advanced maternal age  -     Fetal Nonstress Test; Standing    3. Rh negative, antepartum  Assessment & Plan:  Received repeat dose on 1/16/23      4. Family history of hemophilia    5. Iron deficiency anemia during pregnancy          Counseling:   OB precautions, leaking, VB, danae white vs PTL/Labor  FKC  HTN precautions, HA, vision change, RUQ/epigastric pain, edema  Round Ligament Pain:  The uterus has several ligaments which provide support and keep the uterus in place. As the  uterus grows these ligaments are pulled and stretched which often causes sharp stabbing like pain in the inguinal area.   You may find a pregnancy support band helpful. Changing positions may also help. Yoga is a great way to cope with round ligament and low back pain in pregnancy.    Massage may also help with low back pain   Things to Consider at this Point in your Pregnancy:  Some women experience swelling in their feet during pregnancy. Compression stockings may help  Drink plenty of water and stay active   Make sure you are eating frequent small meals, nuts are a wonderful snack to keep with you            Return in about 2 weeks (around 3/6/2023) for Routine OB visit.      We have gone over prenatal care to include the timing and content of visits. I informed her how to contact the office and/or on call person in the event of any problems and encouraged her to do so when she feels it is necessary.  We then spent time answering her questions which she  indicated were answered to her satisfaction.    Jadyn Miranda, DO  2/20/2023 15:15 EST

## 2023-02-27 ENCOUNTER — HOSPITAL ENCOUNTER (OUTPATIENT)
Facility: HOSPITAL | Age: 38
Discharge: HOME | End: 2023-02-27
Attending: OBSTETRICS & GYNECOLOGY | Admitting: OBSTETRICS & GYNECOLOGY
Payer: COMMERCIAL

## 2023-02-27 VITALS
SYSTOLIC BLOOD PRESSURE: 123 MMHG | RESPIRATION RATE: 18 BRPM | DIASTOLIC BLOOD PRESSURE: 70 MMHG | TEMPERATURE: 98.4 F | HEART RATE: 100 BPM

## 2023-02-27 DIAGNOSIS — O09.523 MULTIGRAVIDA OF ADVANCED MATERNAL AGE IN THIRD TRIMESTER: Primary | ICD-10-CM

## 2023-02-27 PROCEDURE — 59025 FETAL NON-STRESS TEST: CPT | Performed by: OBSTETRICS & GYNECOLOGY

## 2023-02-27 PROCEDURE — 59025 FETAL NON-STRESS TEST: CPT

## 2023-03-05 NOTE — PROGRESS NOTES
Routine Prenatal Visit     Subjective  Latricia Alvarez is a 37 y.o.  at 35w4d here for her routine OB visit.   She is taking her prenatal vitamins.Reports no loss of fluid or vaginal bleeding. Patient doing well without any complaints. Pregnancy complicated by:     Patient Active Problem List   Diagnosis   • AMA (advanced maternal age) multigravida 35+   • Rh negative, antepartum   • Supervision of other normal pregnancy, antepartum   • Iron deficiency anemia during pregnancy   • Family history of hemophilia   • Poor weight gain of pregnancy, third trimester   • Maternal care for breech presentation, single gestation         OB History    Para Term  AB Living   6 4 4   1 4   SAB IAB Ectopic Molar Multiple Live Births     1       4      # Outcome Date GA Lbr Aaron/2nd Weight Sex Delivery Anes PTL Lv   6 Current            5 Term 19 39w1d / 00:13 3920 g (8 lb 10.3 oz) F Vag-Spont EPI N BEA   4 Term  39w0d   F Vag-Spont   BEA   3 IAB            2 Term  40w0d   F Vag-Spont   BEA   1 Term  39w0d   F Vag-Spont   BEA           ROS:   General ROS: negative for - chills or fatigue  Respiratory ROS: negative for - cough or hemoptysis  Cardiovascular ROS: negative for - chest pain or dyspnea on exertion  Genito-Urinary ROS: negative for  change in urinary stream, vaginal discharge   Musculoskeletal ROS: negative for - gait disturbance or joint pain  Dermatological ROS: negative for acne,  dry skin or itching    Objective  Physical Exam:   Vitals:    23 1032   BP: 138/79       Uterine Size: size equals dates  FHT: 110-160 BPM    General appearance - alert, well appearing, and in no distress  Mental status - alert, oriented to person, place, and time  Abdomen- Soft, Gravid uterus, non-tender to palpation  Musculoskeletal: negative for - gait disturbance or joint pain  Extremeties: negative swelling or cyanosis   Dermatological: negative rashes or skin lesions   Pelvic exam: GBS RV  swab performed today    Assessment/Plan:   Diagnoses and all orders for this visit:    1. Supervision of other normal pregnancy, antepartum (Primary)  Assessment & Plan:  MONI finalize:Estimated Date of Delivery: 23      Genetic testing (NIPS-Quad)/CF/AFP:  Declined     COVID: recommended   Flu: recommended   Tdap:recommended     Rhogam: O Negative, rhogam at 28 weeks, 23    Sterilization:?    Anatomy US:Growth at 77%, all anatomy visualized and WNL, Cephalic, 3VC, posterior placenta   Growth US:Growth 64%, FHts 137, Breech presentation, Posterior placenta-Grade 2     PROBS/PLAN  Rh negative-received rhogam 23  Family Hx of hemophilia-Father of MOB  AMA- testing 36 weeks, ASA 81mg daily   Size<dates-Growth us ordered    Orders:  -     CBC (No Diff)  -     Group B Strep (Molecular) - Swab, Vaginal/Rectum  -     POC Urinalysis Dipstick    2. Rh negative, antepartum    3. Antepartum multigravida of advanced maternal age    4. Family history of hemophilia    5. Iron deficiency anemia during pregnancy  Assessment & Plan:  Iron supplementation       6. Maternal care for breech presentation, single gestation          Counseling:   OB precautions, leaking, VB, danae white vs PTL/Labor  FKC  HTN precautions, HA, vision change, RUQ/epigastric pain, edema  Round Ligament Pain:  The uterus has several ligaments which provide support and keep the uterus in place. As the  uterus grows these ligaments are pulled and stretched which often causes sharp stabbing like pain in the inguinal area.   You may find a pregnancy support band helpful. Changing positions may also help. Yoga is a great way to cope with round ligament and low back pain in pregnancy.    Massage may also help with low back pain   Things to Consider at this Point in your Pregnancy:  Some women experience swelling in their feet during pregnancy. Compression stockings may help  Drink plenty of water and stay active   Make sure you are eating  frequent small meals, nuts are a wonderful snack to keep with you            Return in about 1 week (around 3/13/2023) for Routine OB visit.      We have gone over prenatal care to include the timing and content of visits. I informed her how to contact the office and/or on call person in the event of any problems and encouraged her to do so when she feels it is necessary.  We then spent time answering her questions which she indicated were answered to her satisfaction.    Jaydn Miranda DO  3/6/2023 10:47 EST

## 2023-03-05 NOTE — ASSESSMENT & PLAN NOTE
MONI finalize:Estimated Date of Delivery: 23      Genetic testing (NIPS-Quad)/CF/AFP:  Declined     COVID: recommended   Flu: recommended   Tdap:recommended     Rhogam: O Negative, rhogam at 28 weeks, 23    Sterilization:?    Anatomy US:Growth at 77%, all anatomy visualized and WNL, Cephalic, 3VC, posterior placenta   Growth US:Growth 64%, FHts 137, Breech presentation, Posterior placenta-Grade 2     PROBS/PLAN  Rh negative-received rhogam 23  Family Hx of hemophilia-Father of MOB  AMA- testing 36 weeks, ASA 81mg daily   Size<dates-Growth us ordered

## 2023-03-06 ENCOUNTER — HOSPITAL ENCOUNTER (OUTPATIENT)
Facility: HOSPITAL | Age: 38
Discharge: HOME | End: 2023-03-06
Attending: STUDENT IN AN ORGANIZED HEALTH CARE EDUCATION/TRAINING PROGRAM | Admitting: STUDENT IN AN ORGANIZED HEALTH CARE EDUCATION/TRAINING PROGRAM
Payer: COMMERCIAL

## 2023-03-06 ENCOUNTER — HOSPITAL ENCOUNTER (OUTPATIENT)
Dept: LABOR AND DELIVERY | Facility: HOSPITAL | Age: 38
Discharge: HOME | End: 2023-03-06
Payer: COMMERCIAL

## 2023-03-06 ENCOUNTER — ROUTINE PRENATAL (OUTPATIENT)
Dept: OBSTETRICS AND GYNECOLOGY | Facility: CLINIC | Age: 38
End: 2023-03-06
Payer: COMMERCIAL

## 2023-03-06 VITALS — SYSTOLIC BLOOD PRESSURE: 119 MMHG | DIASTOLIC BLOOD PRESSURE: 74 MMHG | HEART RATE: 115 BPM | RESPIRATION RATE: 18 BRPM

## 2023-03-06 VITALS — DIASTOLIC BLOOD PRESSURE: 79 MMHG | BODY MASS INDEX: 32.49 KG/M2 | WEIGHT: 183.4 LBS | SYSTOLIC BLOOD PRESSURE: 138 MMHG

## 2023-03-06 DIAGNOSIS — O26.899 RH NEGATIVE, ANTEPARTUM: ICD-10-CM

## 2023-03-06 DIAGNOSIS — O09.529 ANTEPARTUM MULTIGRAVIDA OF ADVANCED MATERNAL AGE: ICD-10-CM

## 2023-03-06 DIAGNOSIS — Z67.91 RH NEGATIVE, ANTEPARTUM: ICD-10-CM

## 2023-03-06 DIAGNOSIS — D50.9 IRON DEFICIENCY ANEMIA DURING PREGNANCY: ICD-10-CM

## 2023-03-06 DIAGNOSIS — O99.019 IRON DEFICIENCY ANEMIA DURING PREGNANCY: ICD-10-CM

## 2023-03-06 DIAGNOSIS — Z83.2 FAMILY HISTORY OF HEMOPHILIA: ICD-10-CM

## 2023-03-06 DIAGNOSIS — Z34.80 SUPERVISION OF OTHER NORMAL PREGNANCY, ANTEPARTUM: Primary | ICD-10-CM

## 2023-03-06 LAB
DEPRECATED RDW RBC AUTO: 44.9 FL (ref 37–54)
ERYTHROCYTE [DISTWIDTH] IN BLOOD BY AUTOMATED COUNT: 15.7 % (ref 12.3–15.4)
GLUCOSE UR STRIP-MCNC: NEGATIVE MG/DL
HCT VFR BLD AUTO: 27.6 % (ref 34–46.6)
HGB BLD-MCNC: 9 G/DL (ref 12–15.9)
MCH RBC QN AUTO: 26 PG (ref 26.6–33)
MCHC RBC AUTO-ENTMCNC: 32.6 G/DL (ref 31.5–35.7)
MCV RBC AUTO: 79.8 FL (ref 79–97)
PLATELET # BLD AUTO: 328 10*3/MM3 (ref 140–450)
PMV BLD AUTO: 10.5 FL (ref 6–12)
PROT UR STRIP-MCNC: NEGATIVE MG/DL
RBC # BLD AUTO: 3.46 10*6/MM3 (ref 3.77–5.28)
WBC NRBC COR # BLD: 9.99 10*3/MM3 (ref 3.4–10.8)

## 2023-03-06 PROCEDURE — 59025 FETAL NON-STRESS TEST: CPT

## 2023-03-06 PROCEDURE — 0502F SUBSEQUENT PRENATAL CARE: CPT | Performed by: OBSTETRICS & GYNECOLOGY

## 2023-03-06 PROCEDURE — 59025 FETAL NON-STRESS TEST: CPT | Performed by: STUDENT IN AN ORGANIZED HEALTH CARE EDUCATION/TRAINING PROGRAM

## 2023-03-06 PROCEDURE — 85027 COMPLETE CBC AUTOMATED: CPT | Performed by: OBSTETRICS & GYNECOLOGY

## 2023-03-06 PROCEDURE — 87653 STREP B DNA AMP PROBE: CPT | Performed by: OBSTETRICS & GYNECOLOGY

## 2023-03-06 NOTE — NON STRESS TEST
Obstetrical Non-stress Test Interpretation     Name:  Latricia Alvarez  MRN: [id number]    37 y.o. female  at 35w4d    Indication: Advanced maternal age      Fetal Movement: active  Fetal HR Assessment Method: external  Fetal HR (beats/min): 150  Fetal HR Baseline: normal range  Fetal HR Variability: moderate (amplitude range 6 to 25 bpm)  Fetal HR Accelerations: episodic, greater than/equal to 15 bpm, lasting at least 15 seconds  Fetal HR Decelerations: absent  Sinusoidal Pattern Present: absent    /74 (BP Location: Right arm, Patient Position: Sitting)   Pulse 115   Resp 18   LMP 2022 Comment: unknown MONI has not been able to get an appointment yet w OBGYN    Reason for test: Other (Comment) (advanced maternal age)  Date of Test: 3/6/2023  Time frame of test: 3079-4535  RN NST Interpretation: Reactive      Ami Woods RN  3/6/2023  12:58 EST

## 2023-03-07 LAB — GROUP B STREP, DNA: NEGATIVE

## 2023-03-12 NOTE — ASSESSMENT & PLAN NOTE
MONI finalize:Estimated Date of Delivery: 23      Genetic testing (NIPS-Quad)/CF/AFP:  Declined     COVID: recommended   Flu: recommended   Tdap:recommended     Rhogam: O Negative, rhogam at 28 weeks, 23    Sterilization:?    Anatomy US:Growth at 77%, all anatomy visualized and WNL, Cephalic, 3VC, posterior placenta   Growth US:Growth 64%, FHts 137, Breech presentation, Posterior placenta-Grade 2     PROBS/PLAN  Rh negative-received rhogam 23  Family Hx of hemophilia-Father of MOB  AMA- testing 36 weeks, ASA 81mg daily   Breech presentation 3/6/23

## 2023-03-12 NOTE — PROGRESS NOTES
Routine Prenatal Visit     Subjective  Latricia Alvarez is a 37 y.o.  at 36w4d here for her routine OB visit.   She is taking her prenatal vitamins.Reports no loss of fluid or vaginal bleeding. Patient doing well without any complaints. Pregnancy complicated by:     Patient Active Problem List   Diagnosis   • AMA (advanced maternal age) multigravida 35+   • Rh negative, antepartum   • Supervision of other normal pregnancy, antepartum   • Iron deficiency anemia during pregnancy   • Family history of hemophilia   • Poor weight gain of pregnancy, third trimester   • Maternal care for breech presentation, single gestation         OB History    Para Term  AB Living   6 4 4   1 4   SAB IAB Ectopic Molar Multiple Live Births     1       4      # Outcome Date GA Lbr Aaron/2nd Weight Sex Delivery Anes PTL Lv   6 Current            5 Term 19 39w1d / 00:13 3920 g (8 lb 10.3 oz) F Vag-Spont EPI N BEA   4 Term  39w0d   F Vag-Spont   BEA   3 IAB            2 Term  40w0d   F Vag-Spont   BEA   1 Term  39w0d   F Vag-Spont   BEA           ROS:   General ROS: negative for - chills or fatigue  Respiratory ROS: negative for - cough or hemoptysis  Cardiovascular ROS: negative for - chest pain or dyspnea on exertion  Genito-Urinary ROS: negative for  change in urinary stream, vaginal discharge   Musculoskeletal ROS: negative for - gait disturbance or joint pain  Dermatological ROS: negative for acne,  dry skin or itching    Objective  Physical Exam:   Vitals:    23 0915   BP: 113/77       Uterine Size: size equals dates  FHT: 110-160 BPM    General appearance - alert, well appearing, and in no distress  Mental status - alert, oriented to person, place, and time  Abdomen- Soft, Gravid uterus, non-tender to palpation  Musculoskeletal: negative for - gait disturbance or joint pain  Extremeties: negative swelling or cyanosis   Dermatological: negative rashes or skin lesions   BSUS: infant  transverse to maternal right    Assessment/Plan:   Diagnoses and all orders for this visit:    1. Supervision of other normal pregnancy, antepartum (Primary)  Assessment & Plan:  MONI finalize:Estimated Date of Delivery: 23      Genetic testing (NIPS-Quad)/CF/AFP:  Declined     COVID: recommended   Flu: recommended   Tdap:recommended     Rhogam: O Negative, rhogam at 28 weeks, 23    Sterilization:?    Anatomy US:Growth at 77%, all anatomy visualized and WNL, Cephalic, 3VC, posterior placenta   Growth US:Growth 64%, FHts 137, Breech presentation, Posterior placenta-Grade 2     PROBS/PLAN  Rh negative-received rhogam 23  Family Hx of hemophilia-Father of MOB  AMA- testing 36 weeks, ASA 81mg daily   Breech presentation 3/6/23    Orders:  -     POC Urinalysis Dipstick    2. Rh negative, antepartum    3. Antepartum multigravida of advanced maternal age    4. Family history of hemophilia    5. Iron deficiency anemia during pregnancy  Assessment & Plan:  Iron supplementation             Counseling:   OB precautions, leaking, VB, danae white vs PTL/Labor  FKC  HTN precautions, HA, vision change, RUQ/epigastric pain, edema  Round Ligament Pain:  The uterus has several ligaments which provide support and keep the uterus in place. As the  uterus grows these ligaments are pulled and stretched which often causes sharp stabbing like pain in the inguinal area.   You may find a pregnancy support band helpful. Changing positions may also help. Yoga is a great way to cope with round ligament and low back pain in pregnancy.    Massage may also help with low back pain   Things to Consider at this Point in your Pregnancy:  Some women experience swelling in their feet during pregnancy. Compression stockings may help  Drink plenty of water and stay active   Make sure you are eating frequent small meals, nuts are a wonderful snack to keep with you            Return in about 1 week (around 3/20/2023) for Routine OB  visit.      We have gone over prenatal care to include the timing and content of visits. I informed her how to contact the office and/or on call person in the event of any problems and encouraged her to do so when she feels it is necessary.  We then spent time answering her questions which she indicated were answered to her satisfaction.    Jadyn Miranda DO  3/13/2023 09:31 EDT

## 2023-03-13 ENCOUNTER — HOSPITAL ENCOUNTER (OUTPATIENT)
Dept: LABOR AND DELIVERY | Facility: HOSPITAL | Age: 38
Setting detail: SURGERY ADMIT
Discharge: HOME | End: 2023-03-13
Payer: COMMERCIAL

## 2023-03-13 ENCOUNTER — HOSPITAL ENCOUNTER (OUTPATIENT)
Facility: HOSPITAL | Age: 38
Setting detail: SURGERY ADMIT
Discharge: HOME | End: 2023-03-13
Attending: OBSTETRICS & GYNECOLOGY | Admitting: OBSTETRICS & GYNECOLOGY
Payer: COMMERCIAL

## 2023-03-13 ENCOUNTER — ROUTINE PRENATAL (OUTPATIENT)
Dept: OBSTETRICS AND GYNECOLOGY | Facility: CLINIC | Age: 38
End: 2023-03-13
Payer: COMMERCIAL

## 2023-03-13 VITALS
RESPIRATION RATE: 16 BRPM | HEART RATE: 129 BPM | SYSTOLIC BLOOD PRESSURE: 106 MMHG | DIASTOLIC BLOOD PRESSURE: 69 MMHG | OXYGEN SATURATION: 100 %

## 2023-03-13 VITALS — SYSTOLIC BLOOD PRESSURE: 113 MMHG | WEIGHT: 183.4 LBS | DIASTOLIC BLOOD PRESSURE: 77 MMHG | BODY MASS INDEX: 32.49 KG/M2

## 2023-03-13 DIAGNOSIS — D50.9 IRON DEFICIENCY ANEMIA DURING PREGNANCY: ICD-10-CM

## 2023-03-13 DIAGNOSIS — Z83.2 FAMILY HISTORY OF HEMOPHILIA: ICD-10-CM

## 2023-03-13 DIAGNOSIS — O09.529 ANTEPARTUM MULTIGRAVIDA OF ADVANCED MATERNAL AGE: ICD-10-CM

## 2023-03-13 DIAGNOSIS — O26.899 RH NEGATIVE, ANTEPARTUM: ICD-10-CM

## 2023-03-13 DIAGNOSIS — Z67.91 RH NEGATIVE, ANTEPARTUM: ICD-10-CM

## 2023-03-13 DIAGNOSIS — O99.019 IRON DEFICIENCY ANEMIA DURING PREGNANCY: ICD-10-CM

## 2023-03-13 DIAGNOSIS — Z34.80 SUPERVISION OF OTHER NORMAL PREGNANCY, ANTEPARTUM: Primary | ICD-10-CM

## 2023-03-13 LAB
GLUCOSE UR STRIP-MCNC: NEGATIVE MG/DL
PROT UR STRIP-MCNC: NEGATIVE MG/DL

## 2023-03-13 PROCEDURE — 59025 FETAL NON-STRESS TEST: CPT | Performed by: OBSTETRICS & GYNECOLOGY

## 2023-03-13 PROCEDURE — 59025 FETAL NON-STRESS TEST: CPT

## 2023-03-13 PROCEDURE — 0502F SUBSEQUENT PRENATAL CARE: CPT | Performed by: OBSTETRICS & GYNECOLOGY

## 2023-03-13 PROCEDURE — G0463 HOSPITAL OUTPT CLINIC VISIT: HCPCS

## 2023-03-13 NOTE — SIGNIFICANT NOTE
03/13/23 1647   Nonstress Test   Reason for NST Other (Comment)  (ama)   Acoustic Stimulator No   Uterine Irritability No   Contractions Not present   Fetal Assessment   Fetal Movement active   Fetal HR Assessment Method external   Fetal HR (beats/min) 150  (150's)   Fetal HR Baseline normal range   Fetal HR Variability moderate (amplitude range 6 to 25 bpm)   Fetal HR Accelerations episodic;greater than/equal to 15 bpm   Fetal HR Decelerations absent   Sinusoidal Pattern Present absent   Fetal HR Tracing Category Category I   Interpretation A   Nonstress Test Interpretation A Reactive

## 2023-03-13 NOTE — NON STRESS TEST
Obstetrical Non-stress Test Interpretation     Name:  Latricia Alvarez  MRN: [id number]    37 y.o. female  at 36w4d    Indication: Advanced maternal age      Fetal Assessment  Fetal Movement: active  Fetal HR Assessment Method: external  Fetal HR (beats/min): 150  Fetal HR Baseline: normal range  Fetal HR Variability: moderate (amplitude range 6 to 25 bpm)  Fetal HR Accelerations: greater than/equal to 15 bpm, lasting at least 15 seconds  Fetal HR Decelerations: absent  Sinusoidal Pattern Present: absent  Fetal HR Tracing Category: Category I    /69 (BP Location: Right arm, Patient Position: Sitting)   Pulse (!) 129   Resp 16   LMP 2022 Comment: unknown MONI has not been able to get an appointment yet w OBGYN  SpO2 100%     Reason for test: Other (Comment) (Advanced maternal age)  Date of Test: 3/13/2023  Time frame of test: 6221-1201  RN NST Interpretation: Reactive      Eve Saenz RN  3/13/2023  12:12 EDT

## 2023-03-20 ENCOUNTER — HOSPITAL ENCOUNTER (OUTPATIENT)
Dept: LABOR AND DELIVERY | Facility: HOSPITAL | Age: 38
Discharge: HOME | End: 2023-03-20
Payer: COMMERCIAL

## 2023-03-20 ENCOUNTER — HOSPITAL ENCOUNTER (OUTPATIENT)
Facility: HOSPITAL | Age: 38
Discharge: HOME | End: 2023-03-20
Attending: STUDENT IN AN ORGANIZED HEALTH CARE EDUCATION/TRAINING PROGRAM | Admitting: STUDENT IN AN ORGANIZED HEALTH CARE EDUCATION/TRAINING PROGRAM
Payer: COMMERCIAL

## 2023-03-20 ENCOUNTER — ROUTINE PRENATAL (OUTPATIENT)
Dept: OBSTETRICS AND GYNECOLOGY | Facility: CLINIC | Age: 38
End: 2023-03-20
Payer: COMMERCIAL

## 2023-03-20 VITALS — RESPIRATION RATE: 18 BRPM | SYSTOLIC BLOOD PRESSURE: 112 MMHG | DIASTOLIC BLOOD PRESSURE: 72 MMHG | HEART RATE: 110 BPM

## 2023-03-20 VITALS — WEIGHT: 183 LBS | DIASTOLIC BLOOD PRESSURE: 72 MMHG | BODY MASS INDEX: 32.42 KG/M2 | SYSTOLIC BLOOD PRESSURE: 133 MMHG

## 2023-03-20 DIAGNOSIS — D50.9 IRON DEFICIENCY ANEMIA DURING PREGNANCY: ICD-10-CM

## 2023-03-20 DIAGNOSIS — O09.529 ANTEPARTUM MULTIGRAVIDA OF ADVANCED MATERNAL AGE: ICD-10-CM

## 2023-03-20 DIAGNOSIS — O99.019 IRON DEFICIENCY ANEMIA DURING PREGNANCY: ICD-10-CM

## 2023-03-20 DIAGNOSIS — O26.899 RH NEGATIVE, ANTEPARTUM: ICD-10-CM

## 2023-03-20 DIAGNOSIS — O26.13 POOR WEIGHT GAIN OF PREGNANCY, THIRD TRIMESTER: ICD-10-CM

## 2023-03-20 DIAGNOSIS — Z67.91 RH NEGATIVE, ANTEPARTUM: ICD-10-CM

## 2023-03-20 DIAGNOSIS — Z34.80 SUPERVISION OF OTHER NORMAL PREGNANCY, ANTEPARTUM: Primary | ICD-10-CM

## 2023-03-20 LAB
GLUCOSE UR STRIP-MCNC: NEGATIVE MG/DL
PROT UR STRIP-MCNC: NEGATIVE MG/DL

## 2023-03-20 PROCEDURE — 59025 FETAL NON-STRESS TEST: CPT

## 2023-03-20 PROCEDURE — 0502F SUBSEQUENT PRENATAL CARE: CPT | Performed by: OBSTETRICS & GYNECOLOGY

## 2023-03-20 PROCEDURE — 59025 FETAL NON-STRESS TEST: CPT | Performed by: STUDENT IN AN ORGANIZED HEALTH CARE EDUCATION/TRAINING PROGRAM

## 2023-03-20 NOTE — NON STRESS TEST
Obstetrical Non-stress Test Interpretation     Name:  Latricia Alvarez  MRN: [id number]    37 y.o. female  at 37w4d    Indication: advanced maternal age      Fetal Movement: active  Fetal HR Assessment Method: external  Fetal HR (beats/min): 140  Fetal HR Baseline: normal range  Fetal HR Variability: moderate (amplitude range 6 to 25 bpm)  Fetal HR Accelerations: episodic, greater than/equal to 15 bpm, lasting at least 15 seconds  Fetal HR Decelerations: absent  Sinusoidal Pattern Present: absent    /72 (BP Location: Right arm, Patient Position: Sitting)   Pulse 110   Resp 18   LMP 2022 Comment: unknown MONI has not been able to get an appointment yet w OBGYN    Reason for test: Other (Comment) (Advanced Maternal Age)  Date of Test: 3/20/2023  Time frame of test: 1329-3782  RN NST Interpretation: Reactive      Ami Woods RN  3/20/2023  14:17 EDT

## 2023-03-20 NOTE — ASSESSMENT & PLAN NOTE
MONI finalize:Estimated Date of Delivery: 23      Genetic testing (NIPS-Quad)/CF/AFP:  Declined     COVID: recommended   Flu: recommended   Tdap:recommended     Rhogam: O Negative, rhogam at 28 weeks, 23    Sterilization:?    Anatomy US:Growth at 77%, all anatomy visualized and WNL, Cephalic, 3VC, posterior placenta   Growth US:Growth 64%, FHts 137, Breech presentation, Posterior placenta-Grade 2     PROBS/PLAN  Rh negative-received rhogam 23  Family Hx of hemophilia-Father of MOB  AMA- testing 36 weeks, ASA 81mg daily   Breech presentation 3/6/23, vertex 3/20/23

## 2023-03-20 NOTE — PROGRESS NOTES
Routine Prenatal Visit     Subjective  Latricia Alvarez is a 37 y.o.  at 37w4d here for her routine OB visit.   She is taking her prenatal vitamins.Reports no loss of fluid or vaginal bleeding. Patient doing well without any complaints. Pregnancy complicated by:     Patient Active Problem List   Diagnosis   • AMA (advanced maternal age) multigravida 35+   • Rh negative, antepartum   • Supervision of other normal pregnancy, antepartum   • Iron deficiency anemia during pregnancy   • Family history of hemophilia   • Poor weight gain of pregnancy, third trimester   • Maternal care for breech presentation, single gestation         OB History    Para Term  AB Living   6 4 4   1 4   SAB IAB Ectopic Molar Multiple Live Births     1       4      # Outcome Date GA Lbr Aaron/2nd Weight Sex Delivery Anes PTL Lv   6 Current            5 Term 19 39w1d / 00:13 3920 g (8 lb 10.3 oz) F Vag-Spont EPI N BEA   4 Term  39w0d   F Vag-Spont   BEA   3 IAB            2 Term  40w0d   F Vag-Spont   BEA   1 Term  39w0d   F Vag-Spont   EBA           ROS:   General ROS: negative for - chills or fatigue  Respiratory ROS: negative for - cough or hemoptysis  Cardiovascular ROS: negative for - chest pain or dyspnea on exertion  Genito-Urinary ROS: negative for  change in urinary stream, vaginal discharge   Musculoskeletal ROS: negative for - gait disturbance or joint pain  Dermatological ROS: negative for acne,  dry skin or itching    Objective  Physical Exam:   Vitals:    23 1040   BP: 133/72       Uterine Size: size equals dates  FHT: 110-160 BPM    General appearance - alert, well appearing, and in no distress  Mental status - alert, oriented to person, place, and time  Abdomen- Soft, Gravid uterus, non-tender to palpation  Musculoskeletal: negative for - gait disturbance or joint pain  Extremeties: negative swelling or cyanosis   Dermatological: negative rashes or skin lesions   Pelvic exam:  3/70/-3/mid/soft    Assessment/Plan:   Diagnoses and all orders for this visit:    1. Supervision of other normal pregnancy, antepartum (Primary)  Assessment & Plan:  MONI finalize:Estimated Date of Delivery: 23      Genetic testing (NIPS-Quad)/CF/AFP:  Declined     COVID: recommended   Flu: recommended   Tdap:recommended     Rhogam: O Negative, rhogam at 28 weeks, 23    Sterilization:?    Anatomy US:Growth at 77%, all anatomy visualized and WNL, Cephalic, 3VC, posterior placenta   Growth US:Growth 64%, FHts 137, Breech presentation, Posterior placenta-Grade 2     PROBS/PLAN  Rh negative-received rhogam 23  Family Hx of hemophilia-Father of MOB  AMA- testing 36 weeks, ASA 81mg daily   Breech presentation 3/6/23, vertex 3/20/23    Orders:  -     POC Urinalysis Dipstick    2. Antepartum multigravida of advanced maternal age    3. Poor weight gain of pregnancy, third trimester    4. Rh negative, antepartum    5. Iron deficiency anemia during pregnancy          Counseling:   OB precautions, leaking, VB, danae white vs PTL/Labor  FKC  HTN precautions reviewed: HA, vision change, RUQ/epigastric pain, edema  Round Ligament Pain:  The uterus has several ligaments which provide support and keep the uterus in place. As the  uterus grows these ligaments are pulled and stretched which often causes sharp stabbing like pain in the inguinal area.   You may find a pregnancy support band helpful. Changing positions may also help. Yoga is a great way to cope with round ligament and low back pain in pregnancy.    Massage may also help with low back pain   Things to Consider at this Point in your Pregnancy:  Some women experience swelling in their feet during pregnancy. Compression stockings may help  Drink plenty of water and stay active   Make sure you are eating frequent small meals, nuts are a wonderful snack to keep with you            Return in about 1 week (around 3/27/2023) for Routine OB visit.      We  have gone over prenatal care to include the timing and content of visits. I informed her how to contact the office and/or on call person in the event of any problems and encouraged her to do so when she feels it is necessary.  We then spent time answering her questions which she indicated were answered to her satisfaction.    Jadyn Miranda DO  3/20/2023 10:57 EDT

## 2023-03-26 NOTE — PROGRESS NOTES
Routine Prenatal Visit     Subjective  Latricia Alvarez is a 37 y.o.  at 38w4d here for her routine OB visit.   She is taking her prenatal vitamins.Reports no loss of fluid or vaginal bleeding. Patient doing well without any complaints. Pregnancy complicated by:     Patient Active Problem List   Diagnosis   • AMA (advanced maternal age) multigravida 35+   • Rh negative, antepartum   • Supervision of other normal pregnancy, antepartum   • Iron deficiency anemia during pregnancy   • Family history of hemophilia   • Poor weight gain of pregnancy, third trimester         OB History    Para Term  AB Living   6 4 4   1 4   SAB IAB Ectopic Molar Multiple Live Births     1       4      # Outcome Date GA Lbr Aaron/2nd Weight Sex Delivery Anes PTL Lv   6 Current            5 Term 19 39w1d / 00:13 3920 g (8 lb 10.3 oz) F Vag-Spont EPI N BEA   4 Term  39w0d   F Vag-Spont   BEA   3 IAB            2 Term  40w0d   F Vag-Spont   BEA   1 Term  39w0d   F Vag-Spont   BEA           ROS:   General ROS: negative for - chills or fatigue  Respiratory ROS: negative for - cough or hemoptysis  Cardiovascular ROS: negative for - chest pain or dyspnea on exertion  Genito-Urinary ROS: negative for  change in urinary stream, vaginal discharge   Musculoskeletal ROS: negative for - gait disturbance or joint pain  Dermatological ROS: negative for acne,  dry skin or itching    Objective  Physical Exam:   Vitals:    23 1033   BP: 118/78       Uterine Size: size equals dates  FHT: 110-160 BPM    General appearance - alert, well appearing, and in no distress  Mental status - alert, oriented to person, place, and time  Abdomen- Soft, Gravid uterus, non-tender to palpation  Musculoskeletal: negative for - gait disturbance or joint pain  Extremeties: negative swelling or cyanosis   Dermatological: negative rashes or skin lesions   Pelvic exam: 4/70%/-2/mid/soft    Assessment/Plan:   Diagnoses and all orders for  this visit:    1. Supervision of other normal pregnancy, antepartum (Primary)  Assessment & Plan:  MONI finalize:Estimated Date of Delivery: 23      Genetic testing (NIPS-Quad)/CF/AFP:  Declined     COVID: recommended   Flu: recommended   Tdap:recommended     Rhogam: O Negative, rhogam at 28 weeks, 23    Sterilization:none    Anatomy US:Growth at 77%, all anatomy visualized and WNL, Cephalic, 3VC, posterior placenta   Growth US:Growth 64%, FHts 137, Breech presentation, Posterior placenta-Grade 2     PROBS/PLAN  Rh negative-received rhogam 23  Family Hx of hemophilia-Father of CHRIS  AMA- testing 36 weeks, ASA 81mg daily   Breech presentation 3/6/23-vertex 3/20    Orders:  -     POC Urinalysis Dipstick    2. Rh negative, antepartum    3. Poor weight gain of pregnancy, third trimester    4. Antepartum multigravida of advanced maternal age    5. Family history of hemophilia    6. Iron deficiency anemia during pregnancy    7. Proteinuria affecting pregnancy, antepartum          Counseling:   OB precautions, leaking, VB, danae white vs PTL/Labor  FKC  HTN precautions reviewed: HA, vision change, RUQ/epigastric pain, edema  IOL scheduled  IOL reviewed in detail.  R/B/A/SE/E.  All history reviewed and updated.  Pre-IOL exam performed.  Length can be 24-48+hrs.  PLAN: Pitocin.  All questions answered.  She desires to proceed as planned.  Round Ligament Pain:  The uterus has several ligaments which provide support and keep the uterus in place. As the  uterus grows these ligaments are pulled and stretched which often causes sharp stabbing like pain in the inguinal area.   You may find a pregnancy support band helpful. Changing positions may also help. Yoga is a great way to cope with round ligament and low back pain in pregnancy.    Massage may also help with low back pain   Things to Consider at this Point in your Pregnancy:  Some women experience swelling in their feet during pregnancy. Compression  stockings may help  Drink plenty of water and stay active   Make sure you are eating frequent small meals, nuts are a wonderful snack to keep with you            Return in about 7 weeks (around 5/15/2023) for Postpartum visit.      We have gone over prenatal care to include the timing and content of visits. I informed her how to contact the office and/or on call person in the event of any problems and encouraged her to do so when she feels it is necessary.  We then spent time answering her questions which she indicated were answered to her satisfaction.    Jadyn Miranda DO  3/27/2023 10:57 EDT

## 2023-03-27 ENCOUNTER — HOSPITAL ENCOUNTER (OUTPATIENT)
Dept: LABOR AND DELIVERY | Facility: HOSPITAL | Age: 38
Discharge: HOME | End: 2023-03-27
Payer: COMMERCIAL

## 2023-03-27 ENCOUNTER — HOSPITAL ENCOUNTER (OUTPATIENT)
Facility: HOSPITAL | Age: 38
Discharge: HOME | End: 2023-03-27
Attending: STUDENT IN AN ORGANIZED HEALTH CARE EDUCATION/TRAINING PROGRAM | Admitting: STUDENT IN AN ORGANIZED HEALTH CARE EDUCATION/TRAINING PROGRAM
Payer: COMMERCIAL

## 2023-03-27 ENCOUNTER — PREP FOR SURGERY (OUTPATIENT)
Dept: OTHER | Facility: HOSPITAL | Age: 38
End: 2023-03-27
Payer: COMMERCIAL

## 2023-03-27 ENCOUNTER — ROUTINE PRENATAL (OUTPATIENT)
Dept: OBSTETRICS AND GYNECOLOGY | Facility: CLINIC | Age: 38
End: 2023-03-27
Payer: COMMERCIAL

## 2023-03-27 VITALS
SYSTOLIC BLOOD PRESSURE: 106 MMHG | DIASTOLIC BLOOD PRESSURE: 66 MMHG | HEART RATE: 109 BPM | BODY MASS INDEX: 32.96 KG/M2 | WEIGHT: 186 LBS | HEIGHT: 63 IN

## 2023-03-27 VITALS — WEIGHT: 186.2 LBS | BODY MASS INDEX: 32.98 KG/M2 | DIASTOLIC BLOOD PRESSURE: 78 MMHG | SYSTOLIC BLOOD PRESSURE: 118 MMHG

## 2023-03-27 DIAGNOSIS — Z34.80 SUPERVISION OF OTHER NORMAL PREGNANCY, ANTEPARTUM: Primary | ICD-10-CM

## 2023-03-27 DIAGNOSIS — O09.529 ANTEPARTUM MULTIGRAVIDA OF ADVANCED MATERNAL AGE: ICD-10-CM

## 2023-03-27 DIAGNOSIS — O12.10 PROTEINURIA AFFECTING PREGNANCY, ANTEPARTUM: ICD-10-CM

## 2023-03-27 DIAGNOSIS — D50.9 IRON DEFICIENCY ANEMIA DURING PREGNANCY: ICD-10-CM

## 2023-03-27 DIAGNOSIS — Z67.91 RH NEGATIVE, ANTEPARTUM: ICD-10-CM

## 2023-03-27 DIAGNOSIS — O99.019 IRON DEFICIENCY ANEMIA DURING PREGNANCY: ICD-10-CM

## 2023-03-27 DIAGNOSIS — O26.899 RH NEGATIVE, ANTEPARTUM: ICD-10-CM

## 2023-03-27 DIAGNOSIS — O26.13 POOR WEIGHT GAIN OF PREGNANCY, THIRD TRIMESTER: ICD-10-CM

## 2023-03-27 DIAGNOSIS — Z83.2 FAMILY HISTORY OF HEMOPHILIA: ICD-10-CM

## 2023-03-27 LAB
CREAT UR-MCNC: 341.6 MG/DL
GLUCOSE UR STRIP-MCNC: NEGATIVE MG/DL
PROT ?TM UR-MCNC: 58.2 MG/DL
PROT UR STRIP-MCNC: ABNORMAL MG/DL
PROT/CREAT UR: 0.17 MG/G{CREAT}

## 2023-03-27 PROCEDURE — 82570 ASSAY OF URINE CREATININE: CPT | Performed by: OBSTETRICS & GYNECOLOGY

## 2023-03-27 PROCEDURE — 84156 ASSAY OF PROTEIN URINE: CPT | Performed by: OBSTETRICS & GYNECOLOGY

## 2023-03-27 PROCEDURE — 59025 FETAL NON-STRESS TEST: CPT

## 2023-03-27 RX ORDER — FAMOTIDINE 10 MG/ML
20 INJECTION, SOLUTION INTRAVENOUS ONCE AS NEEDED
Status: CANCELLED | OUTPATIENT
Start: 2023-03-27

## 2023-03-27 RX ORDER — METOCLOPRAMIDE HYDROCHLORIDE 5 MG/ML
10 INJECTION INTRAMUSCULAR; INTRAVENOUS ONCE AS NEEDED
Status: CANCELLED | OUTPATIENT
Start: 2023-03-27

## 2023-03-27 RX ORDER — SODIUM CHLORIDE, SODIUM LACTATE, POTASSIUM CHLORIDE, CALCIUM CHLORIDE 600; 310; 30; 20 MG/100ML; MG/100ML; MG/100ML; MG/100ML
125 INJECTION, SOLUTION INTRAVENOUS CONTINUOUS
Status: CANCELLED | OUTPATIENT
Start: 2023-03-27

## 2023-03-27 RX ORDER — PROMETHAZINE HYDROCHLORIDE 25 MG/1
25 TABLET ORAL EVERY 6 HOURS PRN
Status: CANCELLED | OUTPATIENT
Start: 2023-03-27

## 2023-03-27 RX ORDER — SODIUM CHLORIDE 0.9 % (FLUSH) 0.9 %
3 SYRINGE (ML) INJECTION EVERY 12 HOURS SCHEDULED
Status: CANCELLED | OUTPATIENT
Start: 2023-03-27

## 2023-03-27 RX ORDER — ONDANSETRON 4 MG/1
4 TABLET, FILM COATED ORAL EVERY 6 HOURS PRN
Status: CANCELLED | OUTPATIENT
Start: 2023-03-27

## 2023-03-27 RX ORDER — HYDROCODONE BITARTRATE AND ACETAMINOPHEN 5; 325 MG/1; MG/1
1 TABLET ORAL EVERY 4 HOURS PRN
Status: CANCELLED | OUTPATIENT
Start: 2023-03-27 | End: 2023-04-03

## 2023-03-27 RX ORDER — SODIUM CHLORIDE 0.9 % (FLUSH) 0.9 %
10 SYRINGE (ML) INJECTION AS NEEDED
Status: CANCELLED | OUTPATIENT
Start: 2023-03-27

## 2023-03-27 RX ORDER — MISOPROSTOL 200 UG/1
800 TABLET ORAL AS NEEDED
Status: CANCELLED | OUTPATIENT
Start: 2023-03-27

## 2023-03-27 RX ORDER — FAMOTIDINE 20 MG/1
20 TABLET, FILM COATED ORAL 2 TIMES DAILY PRN
Status: CANCELLED | OUTPATIENT
Start: 2023-03-27

## 2023-03-27 RX ORDER — PROMETHAZINE HYDROCHLORIDE 12.5 MG/1
12.5 TABLET ORAL EVERY 6 HOURS PRN
Status: CANCELLED | OUTPATIENT
Start: 2023-03-27

## 2023-03-27 RX ORDER — IBUPROFEN 800 MG/1
800 TABLET ORAL ONCE AS NEEDED
Status: CANCELLED | OUTPATIENT
Start: 2023-03-27

## 2023-03-27 RX ORDER — FAMOTIDINE 20 MG/1
20 TABLET, FILM COATED ORAL ONCE AS NEEDED
Status: CANCELLED | OUTPATIENT
Start: 2023-03-27

## 2023-03-27 RX ORDER — LIDOCAINE HYDROCHLORIDE 10 MG/ML
5 INJECTION, SOLUTION EPIDURAL; INFILTRATION; INTRACAUDAL; PERINEURAL AS NEEDED
Status: CANCELLED | OUTPATIENT
Start: 2023-03-27

## 2023-03-27 RX ORDER — ONDANSETRON 2 MG/ML
4 INJECTION INTRAMUSCULAR; INTRAVENOUS EVERY 6 HOURS PRN
Status: CANCELLED | OUTPATIENT
Start: 2023-03-27

## 2023-03-27 RX ORDER — OXYTOCIN 10 [USP'U]/ML
10 INJECTION, SOLUTION INTRAMUSCULAR; INTRAVENOUS ONCE
Status: CANCELLED | OUTPATIENT
Start: 2023-03-27 | End: 2023-03-27

## 2023-03-27 RX ORDER — MAGNESIUM CARB/ALUMINUM HYDROX 105-160MG
30 TABLET,CHEWABLE ORAL ONCE
Status: CANCELLED | OUTPATIENT
Start: 2023-03-27 | End: 2023-03-27

## 2023-03-27 RX ORDER — ACETAMINOPHEN 325 MG/1
650 TABLET ORAL EVERY 4 HOURS PRN
Status: CANCELLED | OUTPATIENT
Start: 2023-03-27

## 2023-03-27 RX ORDER — TERBUTALINE SULFATE 1 MG/ML
0.25 INJECTION, SOLUTION SUBCUTANEOUS AS NEEDED
Status: CANCELLED | OUTPATIENT
Start: 2023-03-27

## 2023-03-27 RX ORDER — FAMOTIDINE 10 MG/ML
20 INJECTION, SOLUTION INTRAVENOUS 2 TIMES DAILY PRN
Status: CANCELLED | OUTPATIENT
Start: 2023-03-27

## 2023-03-27 RX ORDER — METHYLERGONOVINE MALEATE 0.2 MG/ML
200 INJECTION INTRAVENOUS ONCE AS NEEDED
Status: CANCELLED | OUTPATIENT
Start: 2023-03-27

## 2023-03-27 RX ORDER — HYDROCODONE BITARTRATE AND ACETAMINOPHEN 10; 325 MG/1; MG/1
1 TABLET ORAL EVERY 4 HOURS PRN
Status: CANCELLED | OUTPATIENT
Start: 2023-03-27 | End: 2023-04-03

## 2023-03-27 RX ORDER — ACETAMINOPHEN 325 MG/1
650 TABLET ORAL ONCE AS NEEDED
Status: CANCELLED | OUTPATIENT
Start: 2023-03-27

## 2023-03-27 RX ORDER — TRISODIUM CITRATE DIHYDRATE AND CITRIC ACID MONOHYDRATE 500; 334 MG/5ML; MG/5ML
30 SOLUTION ORAL ONCE AS NEEDED
Status: CANCELLED | OUTPATIENT
Start: 2023-03-27

## 2023-03-27 NOTE — NON STRESS TEST
"Obstetrical Non-stress Test Interpretation     Name:  Latricia Alvarez  MRN: [id number]    37 y.o. female  at 38w4d    Indication: ama      Fetal Assessment  Fetal Movement: active  Fetal HR Assessment Method: external  Fetal HR (beats/min): 160  Fetal HR Baseline: normal range  Fetal HR Variability: moderate (amplitude range 6 to 25 bpm)  Fetal HR Accelerations: greater than/equal to 15 bpm, lasting at least 15 seconds  Fetal HR Decelerations: absent    /66   Pulse 109   Ht 160 cm (63\")   Wt 84.4 kg (186 lb)   LMP 2022 Comment: unknown MONI has not been able to get an appointment yet w OBGYN  BMI 32.95 kg/m²     Reason for test: Other (Comment) (ama)  Date of Test: 3/27/2023  Time frame of test: 3117-4853  RN NST Interpretation: Reactive      Molly Philippe  3/27/2023  13:17 EDT  "

## 2023-03-27 NOTE — ASSESSMENT & PLAN NOTE
MONI finalize:Estimated Date of Delivery: 23      Genetic testing (NIPS-Quad)/CF/AFP:  Declined     COVID: recommended   Flu: recommended   Tdap:recommended     Rhogam: O Negative, rhogam at 28 weeks, 23    Sterilization:none    Anatomy US:Growth at 77%, all anatomy visualized and WNL, Cephalic, 3VC, posterior placenta   Growth US:Growth 64%, FHts 137, Breech presentation, Posterior placenta-Grade 2     PROBS/PLAN  Rh negative-received rhogam 23  Family Hx of hemophilia-Father of MOB  AMA- testing 36 weeks, ASA 81mg daily   Breech presentation 3/6/23-vertex 3/20

## 2023-03-31 ENCOUNTER — HOSPITAL ENCOUNTER (OUTPATIENT)
Dept: LABOR AND DELIVERY | Facility: HOSPITAL | Age: 38
Discharge: HOME | End: 2023-03-31
Payer: COMMERCIAL

## 2023-03-31 ENCOUNTER — ANESTHESIA EVENT (OUTPATIENT)
Dept: LABOR AND DELIVERY | Facility: HOSPITAL | Age: 38
End: 2023-03-31
Payer: COMMERCIAL

## 2023-03-31 ENCOUNTER — HOSPITAL ENCOUNTER (INPATIENT)
Facility: HOSPITAL | Age: 38
LOS: 1 days | Discharge: HOME | End: 2023-04-01
Attending: OBSTETRICS & GYNECOLOGY | Admitting: OBSTETRICS & GYNECOLOGY
Payer: COMMERCIAL

## 2023-03-31 ENCOUNTER — ANESTHESIA (OUTPATIENT)
Dept: LABOR AND DELIVERY | Facility: HOSPITAL | Age: 38
End: 2023-03-31
Payer: COMMERCIAL

## 2023-03-31 DIAGNOSIS — Z34.80 SUPERVISION OF OTHER NORMAL PREGNANCY, ANTEPARTUM: ICD-10-CM

## 2023-03-31 PROBLEM — Z34.90 ENCOUNTER FOR INDUCTION OF LABOR: Status: ACTIVE | Noted: 2023-03-31

## 2023-03-31 LAB
ABO GROUP BLD: NORMAL
BLD GP AB SCN SERPL QL: NEGATIVE
DEPRECATED RDW RBC AUTO: 43.7 FL (ref 37–54)
ERYTHROCYTE [DISTWIDTH] IN BLOOD BY AUTOMATED COUNT: 15.8 % (ref 12.3–15.4)
HCT VFR BLD AUTO: 25.5 % (ref 34–46.6)
HGB BLD-MCNC: 8.6 G/DL (ref 12–15.9)
MCH RBC QN AUTO: 25.9 PG (ref 26.6–33)
MCHC RBC AUTO-ENTMCNC: 33.7 G/DL (ref 31.5–35.7)
MCV RBC AUTO: 76.8 FL (ref 79–97)
PLATELET # BLD AUTO: 261 10*3/MM3 (ref 140–450)
PMV BLD AUTO: 10 FL (ref 6–12)
RBC # BLD AUTO: 3.32 10*6/MM3 (ref 3.77–5.28)
RH BLD: NEGATIVE
T&S EXPIRATION DATE: NORMAL
WBC NRBC COR # BLD: 8.94 10*3/MM3 (ref 3.4–10.8)

## 2023-03-31 PROCEDURE — 25010000002 ROPIVACAINE PER 1 MG: Performed by: ANESTHESIOLOGY

## 2023-03-31 PROCEDURE — C1755 CATHETER, INTRASPINAL: HCPCS | Performed by: ANESTHESIOLOGY

## 2023-03-31 PROCEDURE — 25010000002 FENTANYL CITRATE (PF) 50 MCG/ML SOLUTION: Performed by: ANESTHESIOLOGY

## 2023-03-31 PROCEDURE — 85027 COMPLETE CBC AUTOMATED: CPT | Performed by: OBSTETRICS & GYNECOLOGY

## 2023-03-31 PROCEDURE — 3E033VJ INTRODUCTION OF OTHER HORMONE INTO PERIPHERAL VEIN, PERCUTANEOUS APPROACH: ICD-10-PCS | Performed by: OBSTETRICS & GYNECOLOGY

## 2023-03-31 PROCEDURE — 57135 EXCISION VAGINAL CYST/TUMOR: CPT | Performed by: OBSTETRICS & GYNECOLOGY

## 2023-03-31 PROCEDURE — 10907ZC DRAINAGE OF AMNIOTIC FLUID, THERAPEUTIC FROM PRODUCTS OF CONCEPTION, VIA NATURAL OR ARTIFICIAL OPENING: ICD-10-PCS | Performed by: OBSTETRICS & GYNECOLOGY

## 2023-03-31 PROCEDURE — 86901 BLOOD TYPING SEROLOGIC RH(D): CPT | Performed by: OBSTETRICS & GYNECOLOGY

## 2023-03-31 PROCEDURE — 0UQMXZZ REPAIR VULVA, EXTERNAL APPROACH: ICD-10-PCS | Performed by: OBSTETRICS & GYNECOLOGY

## 2023-03-31 PROCEDURE — 86850 RBC ANTIBODY SCREEN: CPT | Performed by: OBSTETRICS & GYNECOLOGY

## 2023-03-31 PROCEDURE — 86900 BLOOD TYPING SEROLOGIC ABO: CPT | Performed by: OBSTETRICS & GYNECOLOGY

## 2023-03-31 PROCEDURE — 59400 OBSTETRICAL CARE: CPT | Performed by: OBSTETRICS & GYNECOLOGY

## 2023-03-31 RX ORDER — HYDROCODONE BITARTRATE AND ACETAMINOPHEN 10; 325 MG/1; MG/1
1 TABLET ORAL EVERY 4 HOURS PRN
Status: DISCONTINUED | OUTPATIENT
Start: 2023-03-31 | End: 2023-04-01 | Stop reason: HOSPADM

## 2023-03-31 RX ORDER — BISACODYL 10 MG
10 SUPPOSITORY, RECTAL RECTAL DAILY PRN
Status: DISCONTINUED | OUTPATIENT
Start: 2023-04-01 | End: 2023-04-01 | Stop reason: HOSPADM

## 2023-03-31 RX ORDER — FAMOTIDINE 20 MG/1
20 TABLET, FILM COATED ORAL 2 TIMES DAILY PRN
Status: DISCONTINUED | OUTPATIENT
Start: 2023-03-31 | End: 2023-03-31 | Stop reason: HOSPADM

## 2023-03-31 RX ORDER — SODIUM CHLORIDE 0.9 % (FLUSH) 0.9 %
3 SYRINGE (ML) INJECTION EVERY 12 HOURS SCHEDULED
Status: DISCONTINUED | OUTPATIENT
Start: 2023-03-31 | End: 2023-03-31 | Stop reason: HOSPADM

## 2023-03-31 RX ORDER — ONDANSETRON 4 MG/1
4 TABLET, FILM COATED ORAL EVERY 6 HOURS PRN
Status: DISCONTINUED | OUTPATIENT
Start: 2023-03-31 | End: 2023-04-01 | Stop reason: HOSPADM

## 2023-03-31 RX ORDER — EPHEDRINE SULFATE 50 MG/ML
5 INJECTION, SOLUTION INTRAVENOUS
Status: DISCONTINUED | OUTPATIENT
Start: 2023-03-31 | End: 2023-03-31 | Stop reason: HOSPADM

## 2023-03-31 RX ORDER — CALCIUM CARBONATE 200(500)MG
2 TABLET,CHEWABLE ORAL 3 TIMES DAILY PRN
Status: DISCONTINUED | OUTPATIENT
Start: 2023-03-31 | End: 2023-04-01 | Stop reason: HOSPADM

## 2023-03-31 RX ORDER — SODIUM CHLORIDE, SODIUM LACTATE, POTASSIUM CHLORIDE, CALCIUM CHLORIDE 600; 310; 30; 20 MG/100ML; MG/100ML; MG/100ML; MG/100ML
125 INJECTION, SOLUTION INTRAVENOUS CONTINUOUS
Status: DISCONTINUED | OUTPATIENT
Start: 2023-03-31 | End: 2023-04-01 | Stop reason: HOSPADM

## 2023-03-31 RX ORDER — MORPHINE SULFATE 5 MG/ML
5 INJECTION, SOLUTION INTRAMUSCULAR; INTRAVENOUS
Status: DISCONTINUED | OUTPATIENT
Start: 2023-03-31 | End: 2023-03-31 | Stop reason: HOSPADM

## 2023-03-31 RX ORDER — FENTANYL CITRATE 50 UG/ML
INJECTION, SOLUTION INTRAMUSCULAR; INTRAVENOUS
Status: COMPLETED
Start: 2023-03-31 | End: 2023-03-31

## 2023-03-31 RX ORDER — METOCLOPRAMIDE HYDROCHLORIDE 5 MG/ML
10 INJECTION INTRAMUSCULAR; INTRAVENOUS ONCE AS NEEDED
Status: DISCONTINUED | OUTPATIENT
Start: 2023-03-31 | End: 2023-03-31 | Stop reason: HOSPADM

## 2023-03-31 RX ORDER — FAMOTIDINE 20 MG/1
20 TABLET, FILM COATED ORAL ONCE AS NEEDED
Status: DISCONTINUED | OUTPATIENT
Start: 2023-03-31 | End: 2023-04-01 | Stop reason: HOSPADM

## 2023-03-31 RX ORDER — LIDOCAINE HYDROCHLORIDE 10 MG/ML
5 INJECTION, SOLUTION EPIDURAL; INFILTRATION; INTRACAUDAL; PERINEURAL AS NEEDED
Status: DISCONTINUED | OUTPATIENT
Start: 2023-03-31 | End: 2023-03-31 | Stop reason: HOSPADM

## 2023-03-31 RX ORDER — ACETAMINOPHEN 325 MG/1
650 TABLET ORAL ONCE AS NEEDED
Status: DISCONTINUED | OUTPATIENT
Start: 2023-03-31 | End: 2023-04-01 | Stop reason: HOSPADM

## 2023-03-31 RX ORDER — OXYTOCIN 10 [USP'U]/ML
10 INJECTION, SOLUTION INTRAMUSCULAR; INTRAVENOUS ONCE
Status: DISCONTINUED | OUTPATIENT
Start: 2023-03-31 | End: 2023-03-31

## 2023-03-31 RX ORDER — SODIUM CHLORIDE 0.9 % (FLUSH) 0.9 %
10 SYRINGE (ML) INJECTION AS NEEDED
Status: DISCONTINUED | OUTPATIENT
Start: 2023-03-31 | End: 2023-03-31 | Stop reason: HOSPADM

## 2023-03-31 RX ORDER — ONDANSETRON 2 MG/ML
4 INJECTION INTRAMUSCULAR; INTRAVENOUS EVERY 6 HOURS PRN
Status: DISCONTINUED | OUTPATIENT
Start: 2023-03-31 | End: 2023-03-31 | Stop reason: HOSPADM

## 2023-03-31 RX ORDER — PROMETHAZINE HYDROCHLORIDE 12.5 MG/1
12.5 TABLET ORAL EVERY 6 HOURS PRN
Status: DISCONTINUED | OUTPATIENT
Start: 2023-03-31 | End: 2023-03-31 | Stop reason: HOSPADM

## 2023-03-31 RX ORDER — METHYLERGONOVINE MALEATE 0.2 MG/ML
200 INJECTION INTRAVENOUS ONCE AS NEEDED
Status: DISCONTINUED | OUTPATIENT
Start: 2023-03-31 | End: 2023-03-31 | Stop reason: HOSPADM

## 2023-03-31 RX ORDER — IBUPROFEN 600 MG/1
600 TABLET ORAL EVERY 6 HOURS PRN
Status: DISCONTINUED | OUTPATIENT
Start: 2023-03-31 | End: 2023-04-01 | Stop reason: HOSPADM

## 2023-03-31 RX ORDER — TRISODIUM CITRATE DIHYDRATE AND CITRIC ACID MONOHYDRATE 500; 334 MG/5ML; MG/5ML
30 SOLUTION ORAL ONCE AS NEEDED
Status: DISCONTINUED | OUTPATIENT
Start: 2023-03-31 | End: 2023-03-31 | Stop reason: HOSPADM

## 2023-03-31 RX ORDER — ONDANSETRON 4 MG/1
4 TABLET, FILM COATED ORAL EVERY 6 HOURS PRN
Status: DISCONTINUED | OUTPATIENT
Start: 2023-03-31 | End: 2023-03-31 | Stop reason: HOSPADM

## 2023-03-31 RX ORDER — ROPIVACAINE HYDROCHLORIDE 2 MG/ML
INJECTION, SOLUTION EPIDURAL; INFILTRATION; PERINEURAL
Status: COMPLETED
Start: 2023-03-31 | End: 2023-03-31

## 2023-03-31 RX ORDER — ROPIVACAINE HYDROCHLORIDE 2 MG/ML
INJECTION, SOLUTION EPIDURAL; INFILTRATION; PERINEURAL
Status: COMPLETED | OUTPATIENT
Start: 2023-03-31 | End: 2023-03-31

## 2023-03-31 RX ORDER — PROMETHAZINE HYDROCHLORIDE 12.5 MG/1
12.5 TABLET ORAL EVERY 4 HOURS PRN
Status: DISCONTINUED | OUTPATIENT
Start: 2023-03-31 | End: 2023-04-01 | Stop reason: HOSPADM

## 2023-03-31 RX ORDER — SODIUM CHLORIDE 0.9 % (FLUSH) 0.9 %
1-10 SYRINGE (ML) INJECTION AS NEEDED
Status: DISCONTINUED | OUTPATIENT
Start: 2023-03-31 | End: 2023-04-01 | Stop reason: HOSPADM

## 2023-03-31 RX ORDER — IBUPROFEN 800 MG/1
800 TABLET ORAL ONCE AS NEEDED
Status: COMPLETED | OUTPATIENT
Start: 2023-03-31 | End: 2023-04-01

## 2023-03-31 RX ORDER — MISOPROSTOL 200 UG/1
800 TABLET ORAL AS NEEDED
Status: DISCONTINUED | OUTPATIENT
Start: 2023-03-31 | End: 2023-03-31 | Stop reason: HOSPADM

## 2023-03-31 RX ORDER — DOCUSATE SODIUM 100 MG/1
100 CAPSULE, LIQUID FILLED ORAL DAILY
Status: DISCONTINUED | OUTPATIENT
Start: 2023-04-01 | End: 2023-04-01 | Stop reason: HOSPADM

## 2023-03-31 RX ORDER — ACETAMINOPHEN 325 MG/1
650 TABLET ORAL EVERY 6 HOURS PRN
Status: DISCONTINUED | OUTPATIENT
Start: 2023-03-31 | End: 2023-04-01 | Stop reason: HOSPADM

## 2023-03-31 RX ORDER — TERBUTALINE SULFATE 1 MG/ML
0.25 INJECTION, SOLUTION SUBCUTANEOUS AS NEEDED
Status: DISCONTINUED | OUTPATIENT
Start: 2023-03-31 | End: 2023-03-31 | Stop reason: HOSPADM

## 2023-03-31 RX ORDER — FENTANYL CITRATE 50 UG/ML
INJECTION, SOLUTION INTRAMUSCULAR; INTRAVENOUS
Status: COMPLETED | OUTPATIENT
Start: 2023-03-31 | End: 2023-03-31

## 2023-03-31 RX ORDER — MAGNESIUM CARB/ALUMINUM HYDROX 105-160MG
30 TABLET,CHEWABLE ORAL ONCE
Status: DISCONTINUED | OUTPATIENT
Start: 2023-03-31 | End: 2023-03-31 | Stop reason: HOSPADM

## 2023-03-31 RX ORDER — FAMOTIDINE 10 MG/ML
20 INJECTION, SOLUTION INTRAVENOUS 2 TIMES DAILY PRN
Status: DISCONTINUED | OUTPATIENT
Start: 2023-03-31 | End: 2023-03-31 | Stop reason: HOSPADM

## 2023-03-31 RX ORDER — OXYTOCIN/0.9 % SODIUM CHLORIDE 30/500 ML
2 PLASTIC BAG, INJECTION (ML) INTRAVENOUS
Status: DISCONTINUED | OUTPATIENT
Start: 2023-03-31 | End: 2023-04-01 | Stop reason: HOSPADM

## 2023-03-31 RX ORDER — ONDANSETRON 4 MG/1
4 TABLET, FILM COATED ORAL EVERY 8 HOURS PRN
Status: DISCONTINUED | OUTPATIENT
Start: 2023-03-31 | End: 2023-04-01 | Stop reason: HOSPADM

## 2023-03-31 RX ORDER — HYDROCODONE BITARTRATE AND ACETAMINOPHEN 5; 325 MG/1; MG/1
1 TABLET ORAL EVERY 4 HOURS PRN
Status: DISCONTINUED | OUTPATIENT
Start: 2023-03-31 | End: 2023-04-01 | Stop reason: HOSPADM

## 2023-03-31 RX ORDER — FAMOTIDINE 10 MG/ML
20 INJECTION, SOLUTION INTRAVENOUS ONCE AS NEEDED
Status: DISCONTINUED | OUTPATIENT
Start: 2023-03-31 | End: 2023-04-01 | Stop reason: HOSPADM

## 2023-03-31 RX ORDER — ACETAMINOPHEN 325 MG/1
650 TABLET ORAL EVERY 4 HOURS PRN
Status: DISCONTINUED | OUTPATIENT
Start: 2023-03-31 | End: 2023-03-31 | Stop reason: HOSPADM

## 2023-03-31 RX ORDER — PROMETHAZINE HYDROCHLORIDE 25 MG/1
25 TABLET ORAL EVERY 6 HOURS PRN
Status: DISCONTINUED | OUTPATIENT
Start: 2023-03-31 | End: 2023-04-01 | Stop reason: HOSPADM

## 2023-03-31 RX ORDER — LIDOCAINE HYDROCHLORIDE AND EPINEPHRINE 15; 5 MG/ML; UG/ML
INJECTION, SOLUTION EPIDURAL
Status: COMPLETED | OUTPATIENT
Start: 2023-03-31 | End: 2023-03-31

## 2023-03-31 RX ORDER — ONDANSETRON 2 MG/ML
4 INJECTION INTRAMUSCULAR; INTRAVENOUS EVERY 6 HOURS PRN
Status: DISCONTINUED | OUTPATIENT
Start: 2023-03-31 | End: 2023-04-01 | Stop reason: HOSPADM

## 2023-03-31 RX ADMIN — ROPIVACAINE HYDROCHLORIDE 5 ML: 2 INJECTION, SOLUTION EPIDURAL; INFILTRATION at 17:17

## 2023-03-31 RX ADMIN — Medication 999 MILLI-UNITS/MIN: at 18:07

## 2023-03-31 RX ADMIN — LIDOCAINE HYDROCHLORIDE AND EPINEPHRINE 3 ML: 15; 5 INJECTION, SOLUTION EPIDURAL at 17:15

## 2023-03-31 RX ADMIN — Medication 125 MILLI-UNITS/MIN: at 18:20

## 2023-03-31 RX ADMIN — FENTANYL CITRATE 100 MCG: 50 INJECTION, SOLUTION INTRAMUSCULAR; INTRAVENOUS at 17:17

## 2023-03-31 RX ADMIN — Medication 10 ML/HR: at 17:19

## 2023-03-31 RX ADMIN — Medication 2 MILLI-UNITS/MIN: at 13:49

## 2023-03-31 RX ADMIN — WITCH HAZEL 1 PAD: 500 SOLUTION RECTAL; TOPICAL at 22:04

## 2023-03-31 RX ADMIN — SODIUM CHLORIDE, POTASSIUM CHLORIDE, SODIUM LACTATE AND CALCIUM CHLORIDE 125 ML/HR: 600; 310; 30; 20 INJECTION, SOLUTION INTRAVENOUS at 12:40

## 2023-03-31 RX ADMIN — Medication: at 22:04

## 2023-03-31 NOTE — ANESTHESIA PREPROCEDURE EVALUATION
Anesthesia Evaluation     Patient summary reviewed and Nursing notes reviewed                Airway   Mallampati: I  TM distance: >3 FB  Neck ROM: full  No difficulty expected  Dental      Pulmonary - negative pulmonary ROS and normal exam    breath sounds clear to auscultation  Cardiovascular - negative cardio ROS and normal exam    Rhythm: regular  Rate: normal        Neuro/Psych- negative ROS  GI/Hepatic/Renal/Endo    (+) obesity,       Musculoskeletal (-) negative ROS    Abdominal    Substance History - negative use     OB/GYN    (+) Pregnant,         Other                        Anesthesia Plan    ASA 2     epidural       Anesthetic plan, risks, benefits, and alternatives have been provided, discussed and informed consent has been obtained with: patient.        CODE STATUS:    Code Status (Patient has no pulse and is not breathing): CPR (Attempt to Resuscitate)  Medical Interventions (Patient has pulse or is breathing): Full

## 2023-03-31 NOTE — ANESTHESIA PROCEDURE NOTES
Labor Epidural      Patient reassessed immediately prior to procedure    Patient location during procedure: OB  Start Time: 3/31/2023 5:12 PM  Stop Time: 3/31/2023 5:15 PM  Performed By  Anesthesiologist: Stephan Hinton MD  Preanesthetic Checklist  Completed: patient identified, IV checked, site marked, risks and benefits discussed, surgical consent, monitors and equipment checked, pre-op evaluation and timeout performed  Prep:  Pt Position:sitting  Sterile Tech:cap, gloves, gown, mask and sterile barrier  Prep:povidone-iodine 7.5% surgical scrub  Monitoring:blood pressure monitoring and continuous pulse oximetry  Epidural Block Procedure:  Approach:midline  Guidance:landmark technique  Location:L3-L4  Needle Type:Tuohy  Needle Gauge:17 G  Loss of Resistance Medium: air  Paresthesia: none  Aspiration:negative  Test Dose:negative  Medication: lidocaine 1.5%-EPINEPHrine 1:200,000 (XYLOCAINE W/EPI) injection - Epidural   3 mL - 3/31/2023 5:15:00 PM  ropivacaine (NAROPIN) 0.2 % injection - Injection   5 mL - 3/31/2023 5:17:00 PM  fentaNYL citrate (PF) (SUBLIMAZE) injection - Epidural   100 mcg - 3/31/2023 5:17:00 PM  Number of Attempts: 1  Post Assessment:  Dressing:occlusive dressing applied and secured with tape  Pt Tolerance:patient tolerated the procedure well with no apparent complications  Complications:no

## 2023-03-31 NOTE — H&P
Obstetric History and Physical     Latricia Alvarez is a 37 y.o.  at 39w1d weeks EGA.  She presents for IOL for AMA.  Patient denies any vaginal bleeding, loss of fluid or decreased fetal movements.  Her ACOG is reviewed and current.    No past medical history on file.    No past surgical history on file.    Family History   Problem Relation Age of Onset   • Breast cancer Paternal Grandmother    • Bone cancer Maternal Grandfather        Social History     Tobacco Use   • Smoking status: Never   • Smokeless tobacco: Never   Vaping Use   • Vaping Use: Never used   Substance Use Topics   • Alcohol use: Never   • Drug use: Never       Prior to Admission Meds: (Last known outpatient meds at time of note signature)  Prior to Admission medications    Medication Sig Start Date End Date Taking? Authorizing Provider   ferrous sulfate 325 (65 FE) MG tablet Take 1 tablet by mouth Daily With Breakfast. 1/10/23   Jadyn Miranda, DO   Prenatal Vit-Fe Fumarate-FA (PRENATAL VITAMINS PO) Take  by mouth.    Provider, MD Yury         ROS:    General ROS: negative for - chills or fatigue  Ophthalmic ROS: negative for - blurry vision or decreased vision  ENT ROS: negative for - epistaxis, headaches or hearing change  Allergy and Immunology ROS: negative for - hives or insect bite sensitivity  Hematological and Lymphatic ROS: negative for - bleeding problems or blood clots  Endocrine ROS: negative for - breast changes or galactorrhea  Breast ROS: negative for - galactorrhea or new or changing breast lumps  Respiratory ROS: negative for - cough or hemoptysis  Cardiovascular ROS: negative for - chest pain or dyspnea on exertion  Gastrointestinal ROS: negative for - abdominal pain or appetite loss  Genito-Urinary ROS: negative for - change in urinary stream, dysuria   Musculoskeletal ROS: negative for - gait disturbance or joint pain  Neurological ROS: negative for - behavioral changes or bowel and bladder control  changes  Dermatological ROS: negative for acne and dry skin      Vitals:    23 1515   BP: 119/75   Pulse: 102   Resp:    Temp:        Physical Exam   General- NAD, alert and oriented, appropriate  Psych- Normal mood, good memory  CV- Regular rhythm, no murnurs  Resp- CTA to bases, no wheezes  Abdomen- NABS, soft, non distended, non tender, no masses  Abdomen- Gravid, non tender  Fundus-  Size: consistent w dates.  Non tender.  Cvx- 4cm / 70% / -2  Presentation- VTX  Ext/DTRs- No edema    Fetal HR: Category I  Contractions: Regular    Plans reviewed and discussed with Latricia who is in agreement.    Recent Results (from the past 24 hour(s))   CBC (No Diff)    Collection Time: 23 12:47 PM    Specimen: Blood   Result Value Ref Range    WBC 8.94 3.40 - 10.80 10*3/mm3    RBC 3.32 (L) 3.77 - 5.28 10*6/mm3    Hemoglobin 8.6 (L) 12.0 - 15.9 g/dL    Hematocrit 25.5 (L) 34.0 - 46.6 %    MCV 76.8 (L) 79.0 - 97.0 fL    MCH 25.9 (L) 26.6 - 33.0 pg    MCHC 33.7 31.5 - 35.7 g/dL    RDW 15.8 (H) 12.3 - 15.4 %    RDW-SD 43.7 37.0 - 54.0 fl    MPV 10.0 6.0 - 12.0 fL    Platelets 261 140 - 450 10*3/mm3   Type & Screen    Collection Time: 23 12:47 PM    Specimen: Blood   Result Value Ref Range    ABO Type O     RH type Negative     Antibody Screen Negative     T&S Expiration Date 4/3/2023 11:59:59 PM          ASSESSMENT:  39w1d with AMA   Scheduled IOL  GBS: Negative      PLAN:  Admitted for IOL   Labs reviewed and WNL   Delivery: See labor orders, plan for , AROM with clear fluid    Plan of care NLT hospital course, R/B/A/potential SE, suspected length 24-48+hrs have been reviewed with patient and any family or friends present, questions answered to her/his/their satisfaction.  Pt desires to proceed as above.    Counseling:The patient was counseled on the risks, benefits and alternatives of Induction.  Risks reviewed, but are not limited to: bleeding, transfusion, fetal intolerance,  (possibly emergent),   and uterine rupture.  She declines expectant management or  and desires induction.  Reviewed risks w prematurity.  All her questions have been answered to her satisfaction and she desires to proceed.  Specifically with Cytotec, she understands that it is endorsed by the [employer], but not FDA approved for the induction of labor.      Jadyn Miranda, DO

## 2023-03-31 NOTE — L&D DELIVERY NOTE
KimSerena [[id number]]    Labor Events     labor?: No   steroids?: None  Antibiotics during labor?: No  Rupture date/time: 3/31/2023 1641  Rupture type: artificial rupture of membranes  Fluid color: clear  Fluid odor: None  Labor type: Induced Onset of Labor  Labor allowed to proceed with plans for an attempted vaginal birth?: Yes  Induction: Oxytocin  Induction indications: Elective  Complications: None     Labor Event Times    Labor onset date/time: 3/31/2023 1349  Dilation complete date/time: 3/31/2023 1755  Start pushing date/time: 3/31/2023 1800     Anesthesia    Method: Epidural     Operative Delivery    Forceps attempted?: No  Vacuum extractor attempted?: No     Shoulder Dystocia    Shoulder dystocia present: No     Presentation    Presentation: Vertex      Delivery    Birth date/time: 3/31/2023 18:01:00  Delivery type: Vaginal, Spontaneous  Complications: None     Delivery Providers    Delivering clinician: Jadyn Miranda DO   Provider Role    Molly Philippe CircTootie Moralez Baby Nurse     Infant Provider     Anesthesia Provider    Louisa Duarte Tech    Arnaldo Almeida RN       Cord    Vessels: 3 vessels  Complications: None  Delayed cord clamping?: Yes  Gases sent?: No  Stem cell collection (by provider): No     Placenta    Placenta delivery date/time: 3/31/2023 1807  Placenta removal: Spontaneous  Placenta appearance: Intact  Placenta disposition: discarded     Resuscitation    Method: Dried , Tactile Stimulation     Apgars     Living status: Living      Apgars     Component 1 min. 5 min.    Skin color:       Heart rate:       Reflex irritability:       Muscle tone:       Respiratory effort:       Total:            Measurements    Weight:   Length:      Lacerations    Episiotomy: None  Perineal laceration: None  Periurethral laceration?: Yes  Periurethral laceration location: left  Periurethral laceration repaired?: Yes     Vaginal Counts     Initial count  personnel:     Initial count verified by: DULCE MARIA         Procedures    Procedures: Skin tag removal                VAGINAL DELIVERY NOTE          Date: 3/31/2023   Time:  6:01 PM   GA: 39w1d       Encounter for induction of labor        Infant: female  infant   No birth weight on file.       Delivery:  Patient received epidural and progressed to complete. Patient delivered over intact perineum. Infant head delivered in CONNIE position. Infant shoulder and body delivered without difficulty. Infant was placed onto mothers abdomen. Delayed cord clamping for 1 minute. Cord was then clamped and cut. Cord blood obtained and sent.  Placenta delivered spontaneously and was intact, pitocin was started. Vagina was cleared of all clots and inspected for any lacerations. A left periurethral  laceration note, repaired with 3-0 vicryl. Small vaginal skin tag about 5mkK2is removed from vaginal wall and area repaired with 3-0 vicryl X1. All sponges, needles and instruments were counted and correct.    EBL: 50 ml    Complications: None    Anesthesia:  Epidural    Episiotomy:  none    Lacerations:  periurethral    See Delivery Summary for Apgar and Weight.    Jadyn Miranda DO  3/31/2023 18:24 EDT          Electronically signed by Jadyn Miranda DO, 23, 6:23 PM EDT.       [facility] LABOR AND DELIVERY  [address] [place] [place] [address]  Dept: [phone]  Loc: [phone]

## 2023-04-01 VITALS
OXYGEN SATURATION: 100 % | WEIGHT: 186 LBS | TEMPERATURE: 98.6 F | RESPIRATION RATE: 20 BRPM | SYSTOLIC BLOOD PRESSURE: 107 MMHG | BODY MASS INDEX: 32.96 KG/M2 | DIASTOLIC BLOOD PRESSURE: 73 MMHG | HEIGHT: 63 IN | HEART RATE: 90 BPM

## 2023-04-01 LAB
ABO GROUP BLD: NORMAL
BLD GP AB SCN SERPL QL: NEGATIVE
FETAL BLEED: NEGATIVE
NUMBER OF DOSES: NORMAL
RH BLD: NEGATIVE

## 2023-04-01 PROCEDURE — 86900 BLOOD TYPING SEROLOGIC ABO: CPT | Performed by: OBSTETRICS & GYNECOLOGY

## 2023-04-01 PROCEDURE — 85461 HEMOGLOBIN FETAL: CPT | Performed by: OBSTETRICS & GYNECOLOGY

## 2023-04-01 PROCEDURE — 86901 BLOOD TYPING SEROLOGIC RH(D): CPT | Performed by: OBSTETRICS & GYNECOLOGY

## 2023-04-01 PROCEDURE — 25010000002 RHO D IMMUNE GLOBULIN 1500 UNIT/2ML SOLUTION PREFILLED SYRINGE: Performed by: OBSTETRICS & GYNECOLOGY

## 2023-04-01 RX ORDER — IBUPROFEN 800 MG/1
800 TABLET ORAL EVERY 8 HOURS PRN
Qty: 20 TABLET | Refills: 0 | Status: SHIPPED | OUTPATIENT
Start: 2023-04-01

## 2023-04-01 RX ADMIN — HUMAN RHO(D) IMMUNE GLOBULIN 1500 UNITS: 1500 SOLUTION INTRAMUSCULAR; INTRAVENOUS at 13:10

## 2023-04-01 RX ADMIN — Medication 10 ML: at 08:17

## 2023-04-01 RX ADMIN — DOCUSATE SODIUM 100 MG: 100 CAPSULE, LIQUID FILLED ORAL at 08:17

## 2023-04-01 RX ADMIN — IBUPROFEN 800 MG: 800 TABLET, FILM COATED ORAL at 05:16

## 2023-04-01 RX ADMIN — IBUPROFEN 600 MG: 600 TABLET, FILM COATED ORAL at 14:11

## 2023-04-01 NOTE — PLAN OF CARE
Problem: Adult Inpatient Plan of Care  Goal: Plan of Care Review  4/1/2023 1855 by Eduar Blount RN  Outcome: Met  4/1/2023 1712 by Eduar Blount RN  Outcome: Ongoing, Progressing  Goal: Patient-Specific Goal (Individualized)  4/1/2023 1855 by Eduar Blount RN  Outcome: Met  4/1/2023 1712 by Eduar Blount RN  Outcome: Ongoing, Progressing  Goal: Absence of Hospital-Acquired Illness or Injury  4/1/2023 1855 by Eduar Blount RN  Outcome: Met  4/1/2023 1712 by Eduar Blount RN  Outcome: Ongoing, Progressing  Intervention: Identify and Manage Fall Risk  Intervention: Prevent and Manage VTE (Venous Thromboembolism) Risk  Intervention: Prevent Infection  Goal: Optimal Comfort and Wellbeing  4/1/2023 1855 by Eduar Blount RN  Outcome: Met  4/1/2023 1712 by Eduar Blount RN  Outcome: Ongoing, Progressing  Goal: Readiness for Transition of Care  4/1/2023 1855 by Eduar Blount RN  Outcome: Met  4/1/2023 1712 by Eduar Blount RN  Outcome: Ongoing, Progressing  Intervention: Mutually Develop Transition Plan     Problem: Bleeding (Labor)  Goal: Hemostasis  4/1/2023 1855 by Eduar Blount RN  Outcome: Met  4/1/2023 1712 by Eduar Blount RN  Outcome: Ongoing, Progressing     Problem: Change in Fetal Wellbeing (Labor)  Goal: Stable Fetal Wellbeing  4/1/2023 1855 by Eduar Blount RN  Outcome: Met  4/1/2023 1712 by Eduar Blount RN  Outcome: Ongoing, Progressing     Problem: Delayed Labor Progression (Labor)  Goal: Effective Progression to Delivery  4/1/2023 1855 by Eduar Blount RN  Outcome: Met  4/1/2023 1712 by Eduar Blount RN  Outcome: Ongoing, Progressing     Problem: Infection (Labor)  Goal: Absence of Infection Signs and Symptoms  4/1/2023 1855 by Eduar Blount RN  Outcome: Met  4/1/2023 1712 by Eduar Blount RN  Outcome: Ongoing, Progressing  Intervention: Prevent or Manage Infection     Problem: Labor Pain (Labor)  Goal: Acceptable Pain Control  4/1/2023 1855 by Eduar Blount,  RN  Outcome: Met  4/1/2023 1712 by Eduar Blount RN  Outcome: Ongoing, Progressing     Problem: Uterine Tachysystole (Labor)  Goal: Normal Uterine Contraction Pattern  4/1/2023 1855 by Eduar Blount RN  Outcome: Met  4/1/2023 1712 by Eduar Blount, RN  Outcome: Ongoing, Progressing   Goal Outcome Evaluation:      Doing self and infant care. Vss, lochia and pain controlled. Desires discharge.

## 2023-04-01 NOTE — ANESTHESIA POSTPROCEDURE EVALUATION
Patient: Latricia Alvarez    Procedure Summary     Date: 03/31/23 Room / Location:     Anesthesia Start: 1712 Anesthesia Stop: 1801    Procedure: LABOR ANALGESIA Diagnosis:     Scheduled Providers:  Provider: Stephan Hinton MD    Anesthesia Type: epidural ASA Status: 2          Anesthesia Type: epidural    Vitals  Vitals Value Taken Time   /64 04/01/23 0500   Temp 37 °C (98.6 °F) 04/01/23 0500   Pulse 89 04/01/23 0500   Resp 16 04/01/23 0500   SpO2 100 % 03/31/23 1800           Post Anesthesia Care and Evaluation    Patient location during evaluation: bedside  Patient participation: complete - patient participated  Level of consciousness: awake  Pain management: adequate    Airway patency: patent  PONV Status: none  Cardiovascular status: acceptable and stable  Respiratory status: acceptable  Hydration status: acceptable    Comments: An Anesthesiologist personally participated in the most demanding procedures (including induction and emergence if applicable) in the anesthesia plan, monitored the course of anesthesia administration at frequent intervals and remained physically present and available for immediate diagnosis and treatment of emergencies.

## 2023-04-01 NOTE — PLAN OF CARE
Goal Outcome Evaluation:  Plan of Care Reviewed With: patient     Pt no c/o pain, fundus firm without massage, bleeding has been light no clots reported. Pt ambulating to the bathroom and voiding without difficulty. Pt VSS, pt stable throughout the shift. Pt resting in bed, is able to make needs known, call light in reach, will continue current POC

## 2023-04-01 NOTE — PLAN OF CARE
Problem: Adult Inpatient Plan of Care  Goal: Plan of Care Review  Outcome: Ongoing, Progressing  Goal: Patient-Specific Goal (Individualized)  Outcome: Ongoing, Progressing  Goal: Absence of Hospital-Acquired Illness or Injury  Outcome: Ongoing, Progressing  Intervention: Identify and Manage Fall Risk  Intervention: Prevent and Manage VTE (Venous Thromboembolism) Risk  Intervention: Prevent Infection  Goal: Optimal Comfort and Wellbeing  Outcome: Ongoing, Progressing  Goal: Readiness for Transition of Care  Outcome: Ongoing, Progressing     Problem: Bleeding (Labor)  Goal: Hemostasis  Outcome: Ongoing, Progressing     Problem: Change in Fetal Wellbeing (Labor)  Goal: Stable Fetal Wellbeing  Outcome: Ongoing, Progressing     Problem: Delayed Labor Progression (Labor)  Goal: Effective Progression to Delivery  Outcome: Ongoing, Progressing     Problem: Infection (Labor)  Goal: Absence of Infection Signs and Symptoms  Outcome: Ongoing, Progressing  Intervention: Prevent or Manage Infection     Problem: Labor Pain (Labor)  Goal: Acceptable Pain Control  Outcome: Ongoing, Progressing     Problem: Uterine Tachysystole (Labor)  Goal: Normal Uterine Contraction Pattern  Outcome: Ongoing, Progressing   Goal Outcome Evaluation:         Pt up ad yamilka, voiding well. Showered. Pain, lochia and vs wnl. Appropriate bonding with infant.

## 2023-04-01 NOTE — DISCHARGE SUMMARY
OB Discharge Summary        Admit Date:  3/31/2023  Date of Delivery: 3/31/2023   Discharge Date: 23      Reason for Admission:  Induction of labor for AMA     Final Diagnosis:  Patient Active Problem List   Diagnosis   • AMA (advanced maternal age) multigravida 35+   • Rh negative, antepartum   • Supervision of other normal pregnancy, antepartum   • Iron deficiency anemia during pregnancy   • Family history of hemophilia   • Poor weight gain of pregnancy, third trimester   • Encounter for induction of labor       To do at FU: 5-6 weeks postpartum     Antepartum:  Prenatal care is complicated by:  See above Final Diagnosis for list    Intrapartum/Delivery:  OB Surgeon: Dr. Ruben DO   Anesthesia: Epidural  Delivery Type:   Perineum: Vaginal  Feeding method: Breast    Infant: female  infant; viable     Weight: 3520 g (7 lb 12.2 oz)      APGARS: 8  @ 1 minute / 9  @ 5 minutes    Hospital Course/Significant Findings:  Patient admitted for IOL. She received Pitocin and AROM. She received epidural for pain control and progress to complete. She had a normal  and an uncomplicated postpartum stay.     Recent Results (from the past 72 hour(s))   CBC (No Diff)    Collection Time: 23 12:47 PM    Specimen: Blood   Result Value Ref Range    WBC 8.94 3.40 - 10.80 10*3/mm3    RBC 3.32 (L) 3.77 - 5.28 10*6/mm3    Hemoglobin 8.6 (L) 12.0 - 15.9 g/dL    Hematocrit 25.5 (L) 34.0 - 46.6 %    MCV 76.8 (L) 79.0 - 97.0 fL    MCH 25.9 (L) 26.6 - 33.0 pg    MCHC 33.7 31.5 - 35.7 g/dL    RDW 15.8 (H) 12.3 - 15.4 %    RDW-SD 43.7 37.0 - 54.0 fl    MPV 10.0 6.0 - 12.0 fL    Platelets 261 140 - 450 10*3/mm3   Type & Screen    Collection Time: 23 12:47 PM    Specimen: Blood   Result Value Ref Range    ABO Type O     RH type Negative     Antibody Screen Negative     T&S Expiration Date 4/3/2023 11:59:59 PM    ]    Physical Exam:  HEENT:Normocephalic and atraumatic, Thyroid WNL,   Lungs: CTA B/L, negative W/W/R   Abdomen:  Soft, appropriate tenderness to palpation, Uterine fundus firm and below the umbilicus  Extremities: Negative swelling or cyanosis, DTRs WNL, negative clonus    Vitals:    04/01/23 0500   BP: 106/64   Pulse: 89   Resp: 16   Temp: 98.6 °F (37 °C)   SpO2:        Discharge:         Discharge Medications      ASK your doctor about these medications      Instructions Start Date   ferrous sulfate 325 (65 FE) MG tablet   325 mg, Oral, Daily With Breakfast      PRENATAL VITAMINS PO   Oral               Disposition: Home  Diet: Regular    Pelvic Rest: 6 weeks    Condition at discharge: Good    Follow up with: Jadyn Miranda DO or provider of her choice    Follow up in: 5 weeks      Complications: None      Electronically signed by:    Jadyn Miranda DO  04/01/23  06:47 EDT          [facility]  [address] [place] [address]  Dept: [phone]  Loc: [phone]

## 2023-04-01 NOTE — PROGRESS NOTES
Postpartum Progress Note     PPD#1    Latricia Alvarez is a 37 y.o.  who is postpartum day #1. Patient is doing well and denies any complaints. Patient states pain is well controlled.  Patient states lochia is light. Patient denies any HA, vision changes, RUQ pain. Patient denies any postpartum depression or thoughts of harming herself or others. Patient is bottle feeding.        Vitals:    23 0500   BP: 106/64   Pulse: 89   Resp: 16   Temp: 98.6 °F (37 °C)   SpO2:          Physical Exam:  HEENT:Normocephalic and atraumatic, Thyroid WNL,   Lungs: CTA B/L, negative W/W/R   Abdomen: Soft, appropriate tenderness to palpation, Uterine fundus firm and below the umbilicus  Extremities: Negative swelling or cyanosis, DTRs WNL, negative clonus    Recent Results (from the past 24 hour(s))   CBC (No Diff)    Collection Time: 23 12:47 PM    Specimen: Blood   Result Value Ref Range    WBC 8.94 3.40 - 10.80 10*3/mm3    RBC 3.32 (L) 3.77 - 5.28 10*6/mm3    Hemoglobin 8.6 (L) 12.0 - 15.9 g/dL    Hematocrit 25.5 (L) 34.0 - 46.6 %    MCV 76.8 (L) 79.0 - 97.0 fL    MCH 25.9 (L) 26.6 - 33.0 pg    MCHC 33.7 31.5 - 35.7 g/dL    RDW 15.8 (H) 12.3 - 15.4 %    RDW-SD 43.7 37.0 - 54.0 fl    MPV 10.0 6.0 - 12.0 fL    Platelets 261 140 - 450 10*3/mm3   Type & Screen    Collection Time: 23 12:47 PM    Specimen: Blood   Result Value Ref Range    ABO Type O     RH type Negative     Antibody Screen Negative     T&S Expiration Date 4/3/2023 11:59:59 PM    ]    ASSESSMENT/PLAN:    Patient is a 37 y.o.female who is PPD# 1s/p    -Rhneg/Rubella immune-Infant O+, rhogam prior to discharge     -Encourage ambulation    -bottle feeding   -Desires discharge later today if infant ok for discharge    -Patient denies any complaints, continue postpartum care        Jadyn Coomer, DO  2023 06:45 EDT

## 2023-05-22 ENCOUNTER — POSTPARTUM VISIT (OUTPATIENT)
Dept: OBSTETRICS AND GYNECOLOGY | Facility: CLINIC | Age: 38
End: 2023-05-22
Payer: COMMERCIAL

## 2023-05-22 VITALS
WEIGHT: 167.2 LBS | DIASTOLIC BLOOD PRESSURE: 70 MMHG | BODY MASS INDEX: 29.62 KG/M2 | HEIGHT: 63 IN | SYSTOLIC BLOOD PRESSURE: 100 MMHG | HEART RATE: 86 BPM

## 2023-05-22 NOTE — PROGRESS NOTES
"  POSTPARTUM Follow Up Visit      Chief Complaint   Patient presents with   • Postpartum Care     HPI:      • Date of delivery: 3/31/23  • Delivery type:            Perineum: Abrasions, Vaginal  • Delivering Provider:   Dr. Jadyn Miranda      • Feeding: Breast  • Pain:  No  • Vaginal Bleeding:  No  • Depressed/Anxious:  No  EPDS score: 8   #10: 0  • Plans for BC:  Desires to discuss options  • Last pap date and result: Normal 10/10/22      PHYSICAL EXAM:  /70   Pulse 86   Ht 160 cm (63\")   Wt 75.8 kg (167 lb 3.2 oz)   LMP 2022 Comment: unknown MONI has not been able to get an appointment yet w OBGYN  Breastfeeding No Comment: Formula  BMI 29.62 kg/m²  1.814 kg (4 lb)  General- NAD, alert and oriented, appropriate  Psych- Normal mood, good memory, good eye contact  Abdomen- Soft, non distended, non tender, no masses    External genitalia- Normal.    Urethra/Bladder/Vagina- Normal, no masses, non-tender, no prolapse   STITCHES: Intact, well approximated, no evidence of infection  Cvx- Normal, no lesions, no discharge, no CMT  Uterus- Normal size, shape & consistency.  Non tender, mobile, & no prolapse  Adnexa- Normal, no mass, non-tender    Lymphatic- No palpable groin nodes  Ext- No edema    ASSESSMENT AND PLAN:  Diagnoses and all orders for this visit:    1. Postpartum follow-up (Primary)      Counseling:    • All birth control options reviewed in detail.  R/B/A/SE/E of each wrt pts PMHx and prior BC use  • May resume intercourse  • May resume normal activities  • Core strengthening exercises reviewed and recommended  • Kegel exercises reviewed and recommended  • Ok to return to work/school    TRACK MENSES, RTO if <q21d, >7d long, heavy or painful.      Follow Up:  Return in about 1 year (around 2024) for Annual exam.    I spent 20 minutes on the separately reported service of POSTPARTUM CARE. This time is not included in the time used to support the E/M service also reported " today.    Jadyn Miranda,   05/22/2023    [facility]  [address] [place] [address]  Dept: [phone]  Dept Fax: [phone]  Loc: [phone]  Loc Fax: [phone]

## 2024-01-16 ENCOUNTER — TELEPHONE (OUTPATIENT)
Dept: OBSTETRICS AND GYNECOLOGY | Facility: CLINIC | Age: 39
End: 2024-01-16

## 2024-01-31 ENCOUNTER — TELEPHONE (OUTPATIENT)
Dept: OBSTETRICS AND GYNECOLOGY | Facility: CLINIC | Age: 39
End: 2024-01-31
Payer: COMMERCIAL

## 2024-03-20 ENCOUNTER — TELEPHONE (OUTPATIENT)
Dept: OBSTETRICS AND GYNECOLOGY | Facility: CLINIC | Age: 39
End: 2024-03-20
Payer: COMMERCIAL

## 2024-03-20 NOTE — TELEPHONE ENCOUNTER
01/16/24: 1st attempt  to [facility] appt   01/31/24: 2nd attempt resc appt  [id number]: lm re need to bernice dr marroquin on [id number] not in office that day.sb  [id number]: 4 th attempt to [facility]ice appt